# Patient Record
Sex: MALE | Race: BLACK OR AFRICAN AMERICAN | NOT HISPANIC OR LATINO | Employment: OTHER | URBAN - METROPOLITAN AREA
[De-identification: names, ages, dates, MRNs, and addresses within clinical notes are randomized per-mention and may not be internally consistent; named-entity substitution may affect disease eponyms.]

---

## 2024-04-04 ENCOUNTER — HOSPITAL ENCOUNTER (EMERGENCY)
Facility: HOSPITAL | Age: 64
Discharge: HOME | End: 2024-04-04
Attending: EMERGENCY MEDICINE
Payer: COMMERCIAL

## 2024-04-04 VITALS
TEMPERATURE: 98.7 F | SYSTOLIC BLOOD PRESSURE: 115 MMHG | HEIGHT: 64 IN | RESPIRATION RATE: 16 BRPM | WEIGHT: 121.25 LBS | OXYGEN SATURATION: 99 % | DIASTOLIC BLOOD PRESSURE: 75 MMHG | HEART RATE: 84 BPM | BODY MASS INDEX: 20.7 KG/M2

## 2024-04-04 DIAGNOSIS — N39.0 URINARY TRACT INFECTION ASSOCIATED WITH INDWELLING URETHRAL CATHETER, INITIAL ENCOUNTER (CMS-HCC): Primary | ICD-10-CM

## 2024-04-04 DIAGNOSIS — T83.511A URINARY TRACT INFECTION ASSOCIATED WITH INDWELLING URETHRAL CATHETER, INITIAL ENCOUNTER (CMS-HCC): Primary | ICD-10-CM

## 2024-04-04 LAB
ALBUMIN SERPL BCP-MCNC: 4.3 G/DL (ref 3.4–5)
ALP SERPL-CCNC: 77 U/L (ref 33–136)
ALT SERPL W P-5'-P-CCNC: 18 U/L (ref 10–52)
ANION GAP SERPL CALC-SCNC: 12 MMOL/L (ref 10–20)
APPEARANCE UR: ABNORMAL
AST SERPL W P-5'-P-CCNC: 18 U/L (ref 9–39)
BASOPHILS # BLD AUTO: 0.02 X10*3/UL (ref 0–0.1)
BASOPHILS NFR BLD AUTO: 0.2 %
BILIRUB SERPL-MCNC: 0.3 MG/DL (ref 0–1.2)
BILIRUB UR STRIP.AUTO-MCNC: NEGATIVE MG/DL
BNP SERPL-MCNC: 18 PG/ML (ref 0–99)
BUN SERPL-MCNC: 17 MG/DL (ref 6–23)
CALCIUM SERPL-MCNC: 9.9 MG/DL (ref 8.6–10.6)
CHLORIDE SERPL-SCNC: 107 MMOL/L (ref 98–107)
CO2 SERPL-SCNC: 27 MMOL/L (ref 21–32)
COLOR UR: ABNORMAL
CREAT SERPL-MCNC: 0.89 MG/DL (ref 0.5–1.3)
EGFRCR SERPLBLD CKD-EPI 2021: >90 ML/MIN/1.73M*2
EOSINOPHIL # BLD AUTO: 0.09 X10*3/UL (ref 0–0.7)
EOSINOPHIL NFR BLD AUTO: 1.1 %
ERYTHROCYTE [DISTWIDTH] IN BLOOD BY AUTOMATED COUNT: 14.3 % (ref 11.5–14.5)
GLUCOSE SERPL-MCNC: 121 MG/DL (ref 74–99)
GLUCOSE UR STRIP.AUTO-MCNC: NORMAL MG/DL
HCT VFR BLD AUTO: 41.6 % (ref 41–52)
HGB BLD-MCNC: 13.9 G/DL (ref 13.5–17.5)
IMM GRANULOCYTES # BLD AUTO: 0.01 X10*3/UL (ref 0–0.7)
IMM GRANULOCYTES NFR BLD AUTO: 0.1 % (ref 0–0.9)
KETONES UR STRIP.AUTO-MCNC: NEGATIVE MG/DL
LEUKOCYTE ESTERASE UR QL STRIP.AUTO: ABNORMAL
LYMPHOCYTES # BLD AUTO: 1.4 X10*3/UL (ref 1.2–4.8)
LYMPHOCYTES NFR BLD AUTO: 17.1 %
MCH RBC QN AUTO: 27.4 PG (ref 26–34)
MCHC RBC AUTO-ENTMCNC: 33.4 G/DL (ref 32–36)
MCV RBC AUTO: 82 FL (ref 80–100)
MONOCYTES # BLD AUTO: 0.51 X10*3/UL (ref 0.1–1)
MONOCYTES NFR BLD AUTO: 6.2 %
NEUTROPHILS # BLD AUTO: 6.17 X10*3/UL (ref 1.2–7.7)
NEUTROPHILS NFR BLD AUTO: 75.3 %
NITRITE UR QL STRIP.AUTO: ABNORMAL
NRBC BLD-RTO: 0 /100 WBCS (ref 0–0)
PH UR STRIP.AUTO: 8 [PH]
PLATELET # BLD AUTO: 122 X10*3/UL (ref 150–450)
POTASSIUM SERPL-SCNC: 4 MMOL/L (ref 3.5–5.3)
PROT SERPL-MCNC: 7.7 G/DL (ref 6.4–8.2)
PROT UR STRIP.AUTO-MCNC: ABNORMAL MG/DL
RBC # BLD AUTO: 5.08 X10*6/UL (ref 4.5–5.9)
RBC # UR STRIP.AUTO: ABNORMAL /UL
RBC #/AREA URNS AUTO: >20 /HPF
SODIUM SERPL-SCNC: 142 MMOL/L (ref 136–145)
SP GR UR STRIP.AUTO: 1.02
UROBILINOGEN UR STRIP.AUTO-MCNC: NORMAL MG/DL
WBC # BLD AUTO: 8.2 X10*3/UL (ref 4.4–11.3)
WBC #/AREA URNS AUTO: >50 /HPF

## 2024-04-04 PROCEDURE — 51702 INSERT TEMP BLADDER CATH: CPT

## 2024-04-04 PROCEDURE — 81001 URINALYSIS AUTO W/SCOPE: CPT | Performed by: EMERGENCY MEDICINE

## 2024-04-04 PROCEDURE — 83880 ASSAY OF NATRIURETIC PEPTIDE: CPT

## 2024-04-04 PROCEDURE — 85025 COMPLETE CBC W/AUTO DIFF WBC: CPT | Performed by: EMERGENCY MEDICINE

## 2024-04-04 PROCEDURE — 87086 URINE CULTURE/COLONY COUNT: CPT | Performed by: EMERGENCY MEDICINE

## 2024-04-04 PROCEDURE — 80053 COMPREHEN METABOLIC PANEL: CPT | Performed by: EMERGENCY MEDICINE

## 2024-04-04 PROCEDURE — 36415 COLL VENOUS BLD VENIPUNCTURE: CPT | Performed by: EMERGENCY MEDICINE

## 2024-04-04 PROCEDURE — 99283 EMERGENCY DEPT VISIT LOW MDM: CPT

## 2024-04-04 PROCEDURE — 99284 EMERGENCY DEPT VISIT MOD MDM: CPT | Performed by: EMERGENCY MEDICINE

## 2024-04-04 PROCEDURE — 2500000001 HC RX 250 WO HCPCS SELF ADMINISTERED DRUGS (ALT 637 FOR MEDICARE OP): Mod: SE

## 2024-04-04 RX ORDER — CEPHALEXIN 500 MG/1
500 CAPSULE ORAL 2 TIMES DAILY
Qty: 20 CAPSULE | Refills: 0 | Status: SHIPPED | OUTPATIENT
Start: 2024-04-04 | End: 2024-04-14

## 2024-04-04 RX ORDER — CEPHALEXIN 250 MG/1
500 CAPSULE ORAL ONCE
Status: COMPLETED | OUTPATIENT
Start: 2024-04-04 | End: 2024-04-04

## 2024-04-04 RX ORDER — TAMSULOSIN HYDROCHLORIDE 0.4 MG/1
0.4 CAPSULE ORAL DAILY
Qty: 30 CAPSULE | Refills: 0 | Status: SHIPPED | OUTPATIENT
Start: 2024-04-04 | End: 2024-05-04

## 2024-04-04 RX ADMIN — CEPHALEXIN 500 MG: 250 CAPSULE ORAL at 20:34

## 2024-04-04 ASSESSMENT — COLUMBIA-SUICIDE SEVERITY RATING SCALE - C-SSRS
6. HAVE YOU EVER DONE ANYTHING, STARTED TO DO ANYTHING, OR PREPARED TO DO ANYTHING TO END YOUR LIFE?: NO
2. HAVE YOU ACTUALLY HAD ANY THOUGHTS OF KILLING YOURSELF?: NO
1. IN THE PAST MONTH, HAVE YOU WISHED YOU WERE DEAD OR WISHED YOU COULD GO TO SLEEP AND NOT WAKE UP?: NO

## 2024-04-04 ASSESSMENT — PAIN - FUNCTIONAL ASSESSMENT: PAIN_FUNCTIONAL_ASSESSMENT: 0-10
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
## 2024-04-04 NOTE — ED PROVIDER NOTES
Emergency Medicine Transition of Care Note.    I received Gaetano Jarrett in signout from Dr. Solorio.  Please see the previous ED provider note for all HPI, PE and MDM up to the time of signout at 1800h. This is in addition to the primary record.    In brief Gaetano Jarrett is an 63 y.o. male with hx of BPH and chronic urinary retention requiring moreno since 11/23 ,moderate protein malnutrition with BMI 18, tobacco and cocaine use, presenting for acute urinary retention x2d and suprapubic pain/distension.   Chief Complaint   Patient presents with    Moreno Complication     At the time of signout we were awaiting: BNP, UA, and replacement of his moreno.     ED Course as of 04/04/24 2006   Thu Apr 04, 2024   1858 Per discussion with RD, new moreno was placed and pt made 1000cc of urine [JL]      ED Course User Index  [JL] Garrick Palomares MD         Diagnoses as of 04/04/24 2006   Urinary tract infection associated with indwelling urethral catheter, initial encounter (CMS/[facility])       Medical Decision Making  Moreno placement with spontaneous production of 1000cc urine. BNP WNL. Pt feeling much improved s/p moreno. UA resulted with +ve nitrites and leuk esterase, consistent with UTI. Will give 1 dose of keflex while in the ED, prior to discharge with a 10 day supply of keflex as well as a refill of his flomax (states he ran out 1d ago). Prior to DC, pt denies flank pain, and reports significant improvement in his distention and suprapubic pain, and is tolerating PO well without nausea/vomiting. Pt amenable with discharge plan.         Final diagnoses:   None         Procedure  Procedures    Garrick Palomares MD     Discharge        Garrick Palomares MD  Resident  04/04/24 2008     I have reviewed discharge instructions with the patient and spouse. The patient and spouse verbalized understanding. Patient left ED via Discharge Method: wheelchair to Home with spouse. Opportunity for questions and clarification provided. Patient given 1 scripts. To continue your aftercare when you leave the hospital, you may receive an automated call from our care team to check in on how you are doing. This is a free service and part of our promise to provide the best care and service to meet your aftercare needs.  If you have questions, or wish to unsubscribe from this service please call [phone]. Thank you for Choosing our [facility] Emergency Department.         Sowmya Werner RN  09/21/22 4749

## 2024-04-04 NOTE — ED TRIAGE NOTES
Pt to ED with suspected urinary catheter complications. Pt states he has had little to no urine output for 2 days and now has severe lower abdominal pain.

## 2024-04-05 LAB — HOLD SPECIMEN: NORMAL

## 2024-04-06 LAB — BACTERIA UR CULT: ABNORMAL

## 2024-04-07 ENCOUNTER — TELEPHONE (OUTPATIENT)
Dept: PHARMACY | Facility: HOSPITAL | Age: 64
End: 2024-04-07
Payer: COMMERCIAL

## 2024-04-07 NOTE — PROGRESS NOTES
EDPD Note: Duration Adjust    Contacted Gaetano Jarrett regarding positive urine contaminant culture/result that was taken during their recent emergency room visit. I completed education with  patient . Patient states he is feeling much better, so no repeat culture needed at this time. The patient is being treated with the Keflex; however, the duration of the discharge prescription is incorrect. I gave verbal education about the new medication duration found below. Advised patient to finish course of antibiotic and follow up with PCP or urgent care if symptoms do not completely resolve.  No further follow up needed from EDPD Team.     Discharged with Keflex 500mg BID for 10 days     Drug: Keflex 500mg  Si cap BID  Days Supply: 7    Angely Freeman, PharmD

## 2025-02-25 ENCOUNTER — HOSPITAL ENCOUNTER (INPATIENT)
Facility: HOSPITAL | Age: 65
LOS: 2 days | Discharge: NURSING, REHAB OR LONG-TERM CARE | End: 2025-02-28
Attending: EMERGENCY MEDICINE | Admitting: STUDENT IN AN ORGANIZED HEALTH CARE EDUCATION/TRAINING PROGRAM
Payer: COMMERCIAL

## 2025-02-25 ENCOUNTER — APPOINTMENT (OUTPATIENT)
Dept: RADIOLOGY | Facility: HOSPITAL | Age: 65
End: 2025-02-25
Payer: COMMERCIAL

## 2025-02-25 DIAGNOSIS — E43 SEVERE PROTEIN-CALORIE MALNUTRITION (MULTI): ICD-10-CM

## 2025-02-25 DIAGNOSIS — T83.091A OBSTRUCTION OF FOLEY CATHETER, INITIAL ENCOUNTER (CMS-HCC): ICD-10-CM

## 2025-02-25 DIAGNOSIS — N13.9 URINARY OBSTRUCTION: Primary | ICD-10-CM

## 2025-02-25 DIAGNOSIS — F19.10 SUBSTANCE ABUSE (MULTI): ICD-10-CM

## 2025-02-25 DIAGNOSIS — N31.9 NEUROGENIC BLADDER: ICD-10-CM

## 2025-02-25 DIAGNOSIS — I82.4Y3 DVT, LOWER EXTREMITY, PROXIMAL, ACUTE, BILATERAL (MULTI): ICD-10-CM

## 2025-02-25 DIAGNOSIS — N17.9 AKI (ACUTE KIDNEY INJURY) (CMS-HCC): ICD-10-CM

## 2025-02-25 DIAGNOSIS — F14.10 COCAINE USE DISORDER (MULTI): ICD-10-CM

## 2025-02-25 DIAGNOSIS — R60.0 LOCALIZED EDEMA: ICD-10-CM

## 2025-02-25 DIAGNOSIS — R29.898 MUSCULAR DECONDITIONING: ICD-10-CM

## 2025-02-25 DIAGNOSIS — F17.200 NICOTINE USE DISORDER: ICD-10-CM

## 2025-02-25 PROBLEM — N40.0 BENIGN PROSTATIC HYPERPLASIA: Status: ACTIVE | Noted: 2024-03-02

## 2025-02-25 LAB
ALBUMIN SERPL BCP-MCNC: 4.1 G/DL (ref 3.4–5)
ALP SERPL-CCNC: 75 U/L (ref 33–136)
ALT SERPL W P-5'-P-CCNC: 13 U/L (ref 10–52)
ANION GAP BLDV CALCULATED.4IONS-SCNC: 16 MMOL/L (ref 10–25)
ANION GAP SERPL CALC-SCNC: 19 MMOL/L (ref 10–20)
ANION GAP SERPL CALC-SCNC: 22 MMOL/L (ref 10–20)
APPEARANCE UR: ABNORMAL
AST SERPL W P-5'-P-CCNC: 12 U/L (ref 9–39)
BASE EXCESS BLDV CALC-SCNC: -6.6 MMOL/L (ref -2–3)
BASOPHILS # BLD AUTO: 0.02 X10*3/UL (ref 0–0.1)
BASOPHILS NFR BLD AUTO: 0.2 %
BILIRUB SERPL-MCNC: 0.7 MG/DL (ref 0–1.2)
BILIRUB UR STRIP.AUTO-MCNC: NEGATIVE MG/DL
BODY TEMPERATURE: 37 DEGREES CELSIUS
BUN SERPL-MCNC: 123 MG/DL (ref 6–23)
BUN SERPL-MCNC: 133 MG/DL (ref 6–23)
CA-I BLDV-SCNC: 1.18 MMOL/L (ref 1.1–1.33)
CALCIUM SERPL-MCNC: 9.1 MG/DL (ref 8.6–10.6)
CALCIUM SERPL-MCNC: 9.4 MG/DL (ref 8.6–10.6)
CHLORIDE BLDV-SCNC: 101 MMOL/L (ref 98–107)
CHLORIDE SERPL-SCNC: 103 MMOL/L (ref 98–107)
CHLORIDE SERPL-SCNC: 99 MMOL/L (ref 98–107)
CO2 SERPL-SCNC: 16 MMOL/L (ref 21–32)
CO2 SERPL-SCNC: 18 MMOL/L (ref 21–32)
COLOR UR: ABNORMAL
CREAT SERPL-MCNC: 5.82 MG/DL (ref 0.5–1.3)
CREAT SERPL-MCNC: 7.15 MG/DL (ref 0.5–1.3)
EGFRCR SERPLBLD CKD-EPI 2021: 10 ML/MIN/1.73M*2
EGFRCR SERPLBLD CKD-EPI 2021: 8 ML/MIN/1.73M*2
EOSINOPHIL # BLD AUTO: 0.01 X10*3/UL (ref 0–0.7)
EOSINOPHIL NFR BLD AUTO: 0.1 %
ERYTHROCYTE [DISTWIDTH] IN BLOOD BY AUTOMATED COUNT: 13.2 % (ref 11.5–14.5)
GLUCOSE BLDV-MCNC: 187 MG/DL (ref 74–99)
GLUCOSE SERPL-MCNC: 113 MG/DL (ref 74–99)
GLUCOSE SERPL-MCNC: 90 MG/DL (ref 74–99)
GLUCOSE UR STRIP.AUTO-MCNC: NORMAL MG/DL
HCO3 BLDV-SCNC: 20.2 MMOL/L (ref 22–26)
HCT VFR BLD AUTO: 40.2 % (ref 41–52)
HCT VFR BLD EST: 42 % (ref 41–52)
HGB BLD-MCNC: 14.2 G/DL (ref 13.5–17.5)
HGB BLDV-MCNC: 14.1 G/DL (ref 13.5–17.5)
HOLD SPECIMEN: NORMAL
IMM GRANULOCYTES # BLD AUTO: 0.13 X10*3/UL (ref 0–0.7)
IMM GRANULOCYTES NFR BLD AUTO: 1.4 % (ref 0–0.9)
INHALED O2 CONCENTRATION: 21 %
KETONES UR STRIP.AUTO-MCNC: NEGATIVE MG/DL
LACTATE BLDV-SCNC: 1.6 MMOL/L (ref 0.4–2)
LEUKOCYTE ESTERASE UR QL STRIP.AUTO: ABNORMAL
LYMPHOCYTES # BLD AUTO: 1.06 X10*3/UL (ref 1.2–4.8)
LYMPHOCYTES NFR BLD AUTO: 11.2 %
MCH RBC QN AUTO: 28.1 PG (ref 26–34)
MCHC RBC AUTO-ENTMCNC: 35.3 G/DL (ref 32–36)
MCV RBC AUTO: 80 FL (ref 80–100)
MONOCYTES # BLD AUTO: 0.64 X10*3/UL (ref 0.1–1)
MONOCYTES NFR BLD AUTO: 6.8 %
NEUTROPHILS # BLD AUTO: 7.62 X10*3/UL (ref 1.2–7.7)
NEUTROPHILS NFR BLD AUTO: 80.3 %
NITRITE UR QL STRIP.AUTO: NEGATIVE
NRBC BLD-RTO: 0 /100 WBCS (ref 0–0)
OXYHGB MFR BLDV: 20.6 % (ref 45–75)
PCO2 BLDV: 44 MM HG (ref 41–51)
PH BLDV: 7.27 PH (ref 7.33–7.43)
PH UR STRIP.AUTO: 5.5 [PH]
PLATELET # BLD AUTO: 97 X10*3/UL (ref 150–450)
PO2 BLDV: 22 MM HG (ref 35–45)
POTASSIUM BLDV-SCNC: 4.6 MMOL/L (ref 3.5–5.3)
POTASSIUM SERPL-SCNC: 4.3 MMOL/L (ref 3.5–5.3)
POTASSIUM SERPL-SCNC: 5.3 MMOL/L (ref 3.5–5.3)
PROT SERPL-MCNC: 7.8 G/DL (ref 6.4–8.2)
PROT UR STRIP.AUTO-MCNC: ABNORMAL MG/DL
RBC # BLD AUTO: 5.05 X10*6/UL (ref 4.5–5.9)
RBC # UR STRIP.AUTO: ABNORMAL MG/DL
RBC #/AREA URNS AUTO: ABNORMAL /HPF
SAO2 % BLDV: 21 % (ref 45–75)
SODIUM BLDV-SCNC: 133 MMOL/L (ref 136–145)
SODIUM SERPL-SCNC: 132 MMOL/L (ref 136–145)
SODIUM SERPL-SCNC: 136 MMOL/L (ref 136–145)
SP GR UR STRIP.AUTO: 1.01
UROBILINOGEN UR STRIP.AUTO-MCNC: NORMAL MG/DL
WBC # BLD AUTO: 9.5 X10*3/UL (ref 4.4–11.3)
WBC #/AREA URNS AUTO: ABNORMAL /HPF

## 2025-02-25 PROCEDURE — 51702 INSERT TEMP BLADDER CATH: CPT

## 2025-02-25 PROCEDURE — 96372 THER/PROPH/DIAG INJ SC/IM: CPT | Performed by: NURSE PRACTITIONER

## 2025-02-25 PROCEDURE — 36415 COLL VENOUS BLD VENIPUNCTURE: CPT

## 2025-02-25 PROCEDURE — 76770 US EXAM ABDO BACK WALL COMP: CPT

## 2025-02-25 PROCEDURE — 51798 US URINE CAPACITY MEASURE: CPT

## 2025-02-25 PROCEDURE — 84132 ASSAY OF SERUM POTASSIUM: CPT

## 2025-02-25 PROCEDURE — 99223 1ST HOSP IP/OBS HIGH 75: CPT | Performed by: NURSE PRACTITIONER

## 2025-02-25 PROCEDURE — 97161 PT EVAL LOW COMPLEX 20 MIN: CPT | Mod: GP

## 2025-02-25 PROCEDURE — 2500000004 HC RX 250 GENERAL PHARMACY W/ HCPCS (ALT 636 FOR OP/ED): Mod: SE

## 2025-02-25 PROCEDURE — 96375 TX/PRO/DX INJ NEW DRUG ADDON: CPT | Mod: 59

## 2025-02-25 PROCEDURE — 2500000004 HC RX 250 GENERAL PHARMACY W/ HCPCS (ALT 636 FOR OP/ED): Mod: SE | Performed by: NURSE PRACTITIONER

## 2025-02-25 PROCEDURE — 85025 COMPLETE CBC W/AUTO DIFF WBC: CPT

## 2025-02-25 PROCEDURE — 2500000002 HC RX 250 W HCPCS SELF ADMINISTERED DRUGS (ALT 637 FOR MEDICARE OP, ALT 636 FOR OP/ED): Mod: SE | Performed by: NURSE PRACTITIONER

## 2025-02-25 PROCEDURE — 96361 HYDRATE IV INFUSION ADD-ON: CPT | Mod: 59

## 2025-02-25 PROCEDURE — G0378 HOSPITAL OBSERVATION PER HR: HCPCS

## 2025-02-25 PROCEDURE — 96365 THER/PROPH/DIAG IV INF INIT: CPT | Mod: 59

## 2025-02-25 PROCEDURE — 76770 US EXAM ABDO BACK WALL COMP: CPT | Performed by: STUDENT IN AN ORGANIZED HEALTH CARE EDUCATION/TRAINING PROGRAM

## 2025-02-25 PROCEDURE — 97530 THERAPEUTIC ACTIVITIES: CPT | Mod: GP

## 2025-02-25 PROCEDURE — 96366 THER/PROPH/DIAG IV INF ADDON: CPT | Mod: 59

## 2025-02-25 PROCEDURE — 81001 URINALYSIS AUTO W/SCOPE: CPT

## 2025-02-25 PROCEDURE — 87086 URINE CULTURE/COLONY COUNT: CPT

## 2025-02-25 PROCEDURE — 80053 COMPREHEN METABOLIC PANEL: CPT

## 2025-02-25 PROCEDURE — 99285 EMERGENCY DEPT VISIT HI MDM: CPT | Mod: 25 | Performed by: EMERGENCY MEDICINE

## 2025-02-25 RX ORDER — AMOXICILLIN 250 MG
2 CAPSULE ORAL 2 TIMES DAILY
Status: DISCONTINUED | OUTPATIENT
Start: 2025-02-25 | End: 2025-02-28 | Stop reason: HOSPADM

## 2025-02-25 RX ORDER — CEFTRIAXONE 1 G/50ML
1 INJECTION, SOLUTION INTRAVENOUS DAILY
Status: DISCONTINUED | OUTPATIENT
Start: 2025-02-26 | End: 2025-02-28 | Stop reason: HOSPADM

## 2025-02-25 RX ORDER — HEPARIN SODIUM 5000 [USP'U]/ML
5000 INJECTION, SOLUTION INTRAVENOUS; SUBCUTANEOUS EVERY 8 HOURS
Status: DISCONTINUED | OUTPATIENT
Start: 2025-02-25 | End: 2025-02-28

## 2025-02-25 RX ORDER — IBUPROFEN 200 MG
1 TABLET ORAL
COMMUNITY
Start: 2024-03-03

## 2025-02-25 RX ORDER — TAMSULOSIN HYDROCHLORIDE 0.4 MG/1
0.4 CAPSULE ORAL DAILY
COMMUNITY
Start: 2024-08-26

## 2025-02-25 RX ORDER — SODIUM CHLORIDE, SODIUM LACTATE, POTASSIUM CHLORIDE, CALCIUM CHLORIDE 600; 310; 30; 20 MG/100ML; MG/100ML; MG/100ML; MG/100ML
250 INJECTION, SOLUTION INTRAVENOUS CONTINUOUS
Status: DISPENSED | OUTPATIENT
Start: 2025-02-25 | End: 2025-02-26

## 2025-02-25 RX ORDER — MORPHINE SULFATE 4 MG/ML
4 INJECTION INTRAVENOUS ONCE
Status: COMPLETED | OUTPATIENT
Start: 2025-02-25 | End: 2025-02-25

## 2025-02-25 RX ORDER — IBUPROFEN 200 MG
1 TABLET ORAL EVERY 24 HOURS
Status: DISCONTINUED | OUTPATIENT
Start: 2025-02-25 | End: 2025-02-28 | Stop reason: HOSPADM

## 2025-02-25 RX ORDER — CEFTRIAXONE 1 G/50ML
INJECTION, SOLUTION INTRAVENOUS
Status: COMPLETED
Start: 2025-02-25 | End: 2025-02-25

## 2025-02-25 RX ORDER — CEFTRIAXONE 1 G/50ML
1 INJECTION, SOLUTION INTRAVENOUS ONCE
Status: COMPLETED | OUTPATIENT
Start: 2025-02-25 | End: 2025-02-25

## 2025-02-25 RX ORDER — LIDOCAINE 4 G/100G
1 PATCH TOPICAL DAILY
COMMUNITY
Start: 2024-10-11 | End: 2025-02-25 | Stop reason: ALTCHOICE

## 2025-02-25 RX ORDER — TAMSULOSIN HYDROCHLORIDE 0.4 MG/1
0.4 CAPSULE ORAL DAILY
Status: DISCONTINUED | OUTPATIENT
Start: 2025-02-25 | End: 2025-02-28 | Stop reason: HOSPADM

## 2025-02-25 RX ADMIN — CEFTRIAXONE SODIUM 1 G: 1 INJECTION, SOLUTION INTRAVENOUS at 12:50

## 2025-02-25 RX ADMIN — CEFTRIAXONE 1 G: 1 INJECTION, SOLUTION INTRAVENOUS at 12:50

## 2025-02-25 RX ADMIN — MORPHINE SULFATE 4 MG: 4 INJECTION INTRAVENOUS at 13:12

## 2025-02-25 RX ADMIN — HEPARIN SODIUM 5000 UNITS: 5000 INJECTION, SOLUTION INTRAVENOUS; SUBCUTANEOUS at 22:23

## 2025-02-25 RX ADMIN — SODIUM CHLORIDE, POTASSIUM CHLORIDE, SODIUM LACTATE AND CALCIUM CHLORIDE 250 ML/HR: 600; 310; 30; 20 INJECTION, SOLUTION INTRAVENOUS at 22:43

## 2025-02-25 RX ADMIN — HEPARIN SODIUM 5000 UNITS: 5000 INJECTION, SOLUTION INTRAVENOUS; SUBCUTANEOUS at 14:44

## 2025-02-25 RX ADMIN — SODIUM CHLORIDE, POTASSIUM CHLORIDE, SODIUM LACTATE AND CALCIUM CHLORIDE 250 ML/HR: 600; 310; 30; 20 INJECTION, SOLUTION INTRAVENOUS at 16:45

## 2025-02-25 RX ADMIN — SODIUM CHLORIDE 1000 ML: 9 INJECTION, SOLUTION INTRAVENOUS at 12:50

## 2025-02-25 RX ADMIN — TAMSULOSIN HYDROCHLORIDE 0.4 MG: 0.4 CAPSULE ORAL at 14:44

## 2025-02-25 SDOH — ECONOMIC STABILITY: HOUSING INSECURITY: AT ANY TIME IN THE PAST 12 MONTHS, WERE YOU HOMELESS OR LIVING IN A SHELTER (INCLUDING NOW)?: YES

## 2025-02-25 SDOH — SOCIAL STABILITY: SOCIAL INSECURITY
WITHIN THE LAST YEAR, HAVE YOU BEEN RAPED OR FORCED TO HAVE ANY KIND OF SEXUAL ACTIVITY BY YOUR PARTNER OR EX-PARTNER?: NO

## 2025-02-25 SDOH — ECONOMIC STABILITY: FOOD INSECURITY: HOW HARD IS IT FOR YOU TO PAY FOR THE VERY BASICS LIKE FOOD, HOUSING, MEDICAL CARE, AND HEATING?: VERY HARD

## 2025-02-25 SDOH — ECONOMIC STABILITY: FOOD INSECURITY: WITHIN THE PAST 12 MONTHS, YOU WORRIED THAT YOUR FOOD WOULD RUN OUT BEFORE YOU GOT THE MONEY TO BUY MORE.: OFTEN TRUE

## 2025-02-25 SDOH — ECONOMIC STABILITY: INCOME INSECURITY: IN THE PAST 12 MONTHS HAS THE ELECTRIC, GAS, OIL, OR WATER COMPANY THREATENED TO SHUT OFF SERVICES IN YOUR HOME?: YES

## 2025-02-25 SDOH — SOCIAL STABILITY: SOCIAL INSECURITY: HAVE YOU HAD ANY THOUGHTS OF HARMING ANYONE ELSE?: NO

## 2025-02-25 SDOH — ECONOMIC STABILITY: FOOD INSECURITY: WITHIN THE PAST 12 MONTHS, THE FOOD YOU BOUGHT JUST DIDN'T LAST AND YOU DIDN'T HAVE MONEY TO GET MORE.: OFTEN TRUE

## 2025-02-25 SDOH — SOCIAL STABILITY: SOCIAL INSECURITY: DOES ANYONE TRY TO KEEP YOU FROM HAVING/CONTACTING OTHER FRIENDS OR DOING THINGS OUTSIDE YOUR HOME?: NO

## 2025-02-25 SDOH — SOCIAL STABILITY: SOCIAL INSECURITY
WITHIN THE LAST YEAR, HAVE YOU BEEN KICKED, HIT, SLAPPED, OR OTHERWISE PHYSICALLY HURT BY YOUR PARTNER OR EX-PARTNER?: NO

## 2025-02-25 SDOH — SOCIAL STABILITY: SOCIAL INSECURITY: HAS ANYONE EVER THREATENED TO HURT YOUR FAMILY OR YOUR PETS?: NO

## 2025-02-25 SDOH — SOCIAL STABILITY: SOCIAL INSECURITY: HAVE YOU HAD THOUGHTS OF HARMING ANYONE ELSE?: NO

## 2025-02-25 SDOH — SOCIAL STABILITY: SOCIAL INSECURITY: WITHIN THE LAST YEAR, HAVE YOU BEEN AFRAID OF YOUR PARTNER OR EX-PARTNER?: NO

## 2025-02-25 SDOH — ECONOMIC STABILITY: HOUSING INSECURITY: IN THE LAST 12 MONTHS, WAS THERE A TIME WHEN YOU WERE NOT ABLE TO PAY THE MORTGAGE OR RENT ON TIME?: YES

## 2025-02-25 SDOH — ECONOMIC STABILITY: TRANSPORTATION INSECURITY: IN THE PAST 12 MONTHS, HAS LACK OF TRANSPORTATION KEPT YOU FROM MEDICAL APPOINTMENTS OR FROM GETTING MEDICATIONS?: YES

## 2025-02-25 SDOH — SOCIAL STABILITY: SOCIAL INSECURITY: DO YOU FEEL ANYONE HAS EXPLOITED OR TAKEN ADVANTAGE OF YOU FINANCIALLY OR OF YOUR PERSONAL PROPERTY?: NO

## 2025-02-25 SDOH — SOCIAL STABILITY: SOCIAL INSECURITY: WERE YOU ABLE TO COMPLETE ALL THE BEHAVIORAL HEALTH SCREENINGS?: YES

## 2025-02-25 SDOH — SOCIAL STABILITY: SOCIAL INSECURITY: ARE YOU OR HAVE YOU BEEN THREATENED OR ABUSED PHYSICALLY, EMOTIONALLY, OR SEXUALLY BY ANYONE?: NO

## 2025-02-25 SDOH — SOCIAL STABILITY: SOCIAL INSECURITY: ABUSE: ADULT

## 2025-02-25 SDOH — ECONOMIC STABILITY: GENERAL: SOCIAL DRIVERS OF HEALTH CONSIDERATIONS: HOUSING INSECURITY

## 2025-02-25 SDOH — SOCIAL STABILITY: SOCIAL INSECURITY: WITHIN THE LAST YEAR, HAVE YOU BEEN HUMILIATED OR EMOTIONALLY ABUSED IN OTHER WAYS BY YOUR PARTNER OR EX-PARTNER?: NO

## 2025-02-25 SDOH — SOCIAL STABILITY: SOCIAL INSECURITY: ARE THERE ANY APPARENT SIGNS OF INJURIES/BEHAVIORS THAT COULD BE RELATED TO ABUSE/NEGLECT?: NO

## 2025-02-25 SDOH — ECONOMIC STABILITY: HOUSING INSECURITY: IN THE PAST 12 MONTHS, HOW MANY TIMES HAVE YOU MOVED WHERE YOU WERE LIVING?: 0

## 2025-02-25 SDOH — SOCIAL STABILITY: SOCIAL INSECURITY: DO YOU FEEL UNSAFE GOING BACK TO THE PLACE WHERE YOU ARE LIVING?: NO

## 2025-02-25 ASSESSMENT — ACTIVITIES OF DAILY LIVING (ADL)
LACK_OF_TRANSPORTATION: YES
WALKS IN HOME: DEPENDENT
TOILETING: NEEDS ASSISTANCE
HEARING - RIGHT EAR: FUNCTIONAL
FEEDING YOURSELF: INDEPENDENT
HEARING - LEFT EAR: FUNCTIONAL
JUDGMENT_ADEQUATE_SAFELY_COMPLETE_DAILY_ACTIVITIES: YES
BATHING: NEEDS ASSISTANCE
LACK_OF_TRANSPORTATION: YES
GROOMING: NEEDS ASSISTANCE
PATIENT'S MEMORY ADEQUATE TO SAFELY COMPLETE DAILY ACTIVITIES?: YES
ADEQUATE_TO_COMPLETE_ADL: YES
LACK_OF_TRANSPORTATION: YES
DRESSING YOURSELF: NEEDS ASSISTANCE

## 2025-02-25 ASSESSMENT — COGNITIVE AND FUNCTIONAL STATUS - GENERAL
TURNING FROM BACK TO SIDE WHILE IN FLAT BAD: A LITTLE
TURNING FROM BACK TO SIDE WHILE IN FLAT BAD: A LITTLE
PATIENT BASELINE BEDBOUND: NO
DRESSING REGULAR UPPER BODY CLOTHING: A LITTLE
MOBILITY SCORE: 14
MOVING FROM LYING ON BACK TO SITTING ON SIDE OF FLAT BED WITH BEDRAILS: A LITTLE
TOILETING: A LOT
DRESSING REGULAR LOWER BODY CLOTHING: A LITTLE
MOVING TO AND FROM BED TO CHAIR: A LOT
STANDING UP FROM CHAIR USING ARMS: A LOT
WALKING IN HOSPITAL ROOM: A LOT
CLIMB 3 TO 5 STEPS WITH RAILING: TOTAL
MOVING TO AND FROM BED TO CHAIR: A LOT
CLIMB 3 TO 5 STEPS WITH RAILING: TOTAL
WALKING IN HOSPITAL ROOM: A LOT
MOBILITY SCORE: 13
HELP NEEDED FOR BATHING: A LITTLE
DAILY ACTIVITIY SCORE: 18
STANDING UP FROM CHAIR USING ARMS: A LITTLE
PERSONAL GROOMING: A LITTLE
MOVING FROM LYING ON BACK TO SITTING ON SIDE OF FLAT BED WITH BEDRAILS: A LITTLE

## 2025-02-25 ASSESSMENT — PATIENT HEALTH QUESTIONNAIRE - PHQ9
1. LITTLE INTEREST OR PLEASURE IN DOING THINGS: NOT AT ALL
SUM OF ALL RESPONSES TO PHQ9 QUESTIONS 1 & 2: 0
2. FEELING DOWN, DEPRESSED OR HOPELESS: NOT AT ALL

## 2025-02-25 ASSESSMENT — LIFESTYLE VARIABLES
EVER FELT BAD OR GUILTY ABOUT YOUR DRINKING: NO
TOTAL SCORE: 0
AUDIT-C TOTAL SCORE: -1
HOW OFTEN DO YOU HAVE A DRINK CONTAINING ALCOHOL: PATIENT DECLINED
HAVE PEOPLE ANNOYED YOU BY CRITICIZING YOUR DRINKING: NO
HOW OFTEN DO YOU HAVE 6 OR MORE DRINKS ON ONE OCCASION: PATIENT DECLINED
EVER HAD A DRINK FIRST THING IN THE MORNING TO STEADY YOUR NERVES TO GET RID OF A HANGOVER: NO
SKIP TO QUESTIONS 9-10: 0
AUDIT-C TOTAL SCORE: -1
HAVE YOU EVER FELT YOU SHOULD CUT DOWN ON YOUR DRINKING: NO
HOW MANY STANDARD DRINKS CONTAINING ALCOHOL DO YOU HAVE ON A TYPICAL DAY: PATIENT DECLINED

## 2025-02-25 ASSESSMENT — ENCOUNTER SYMPTOMS
GASTROINTESTINAL NEGATIVE: 1
CONSTITUTIONAL NEGATIVE: 1
EYES NEGATIVE: 1
NEUROLOGICAL NEGATIVE: 1
MUSCULOSKELETAL NEGATIVE: 1
CARDIOVASCULAR NEGATIVE: 1

## 2025-02-25 ASSESSMENT — PAIN DESCRIPTION - LOCATION
LOCATION: ABDOMEN
LOCATION: ABDOMEN

## 2025-02-25 ASSESSMENT — PAIN - FUNCTIONAL ASSESSMENT
PAIN_FUNCTIONAL_ASSESSMENT: 0-10

## 2025-02-25 ASSESSMENT — PAIN SCALES - GENERAL
PAINLEVEL_OUTOF10: 0 - NO PAIN
PAINLEVEL_OUTOF10: 2
PAINLEVEL_OUTOF10: 0 - NO PAIN
PAINLEVEL_OUTOF10: 0 - NO PAIN
PAINLEVEL_OUTOF10: 7
PAINLEVEL_OUTOF10: 6
PAINLEVEL_OUTOF10: 10 - WORST POSSIBLE PAIN

## 2025-02-25 ASSESSMENT — COLUMBIA-SUICIDE SEVERITY RATING SCALE - C-SSRS
2. HAVE YOU ACTUALLY HAD ANY THOUGHTS OF KILLING YOURSELF?: NO
6. HAVE YOU EVER DONE ANYTHING, STARTED TO DO ANYTHING, OR PREPARED TO DO ANYTHING TO END YOUR LIFE?: NO
1. IN THE PAST MONTH, HAVE YOU WISHED YOU WERE DEAD OR WISHED YOU COULD GO TO SLEEP AND NOT WAKE UP?: NO

## 2025-02-25 ASSESSMENT — PAIN DESCRIPTION - PAIN TYPE: TYPE: ACUTE PAIN

## 2025-02-25 NOTE — ED PROVIDER NOTES
EMERGENCY DEPARTMENT ENCOUNTER      Pt Name: Gaetano Jarrett  MRN: [id number]  Birthdate 1960  Date of evaluation: 2/25/2025    HISTORY OF PRESENT ILLNESS    Gaetano Jarrett is an 64 y.o. male with history including BPH, osteoarthritis, neurogenic bladder with chronic indwelling Dan catheter, DVT on Eliquis, and substance abuse presenting to the emergency department for Dan complication.  Patient states that his Dan stopped draining a week ago.  Over the last few days he has had urine leaking around the Dan but nothing coming out of the Dan.  He is got a lot of abdominal distention and discomfort.  Patient states the last time the Dan was exchanged was a probably a month and a half ago.  Patient is currently living with his girlfriend and his partner's children.  He was not aware that he had to have the Dan exchanged every month.  He has had this Dan for 2 years.  Patient has also been mostly bedbound for 2 years.  He denies any chest pain, shortness of breath, fevers, chills, nausea, vomiting, diarrhea, constipation.      PAST MEDICAL HISTORY   No past medical history on file.    SURGICAL HISTORY     No past surgical history on file.    CURRENT MEDICATIONS       Previous Medications    No medications on file       ALLERGIES     Patient has no known allergies.    FAMILY HISTORY     No family history on file.     SOCIAL HISTORY       Social History     Socioeconomic History    Marital status: Single     Social Drivers of Health     Financial Resource Strain: High Risk (3/1/2024)    Received from [facility]    Overall Financial Resource Strain (CARDIA)     Difficulty of Paying Living Expenses: Hard   Food Insecurity: Food Insecurity Present (3/1/2024)    Received from [facility]    Hunger Vital Sign     Worried About Running Out of Food in the Last Year: Sometimes true     Ran Out of Food in the Last Year: Sometimes true   Transportation Needs: Unmet Transportation Needs (3/1/2024)     Received from [facility]    PRAPARE - Transportation     Lack of Transportation (Medical): Yes     Lack of Transportation (Non-Medical): No   Housing Stability: High Risk (3/1/2024)    Received from [facility]    Housing Stability Vital Sign     Unable to Pay for Housing in the Last Year: Yes     Number of Places Lived in the Last Year: 2     In the last 12 months, was there a time when you did not have a steady place to sleep or slept in a shelter (including now)?: Yes       PHYSICAL EXAM       ED Triage Vitals [02/25/25 1024]   Temperature Heart Rate Respirations BP   37 °C (98.6 °F) 90 16 132/89      Pulse Ox Temp Source Heart Rate Source Patient Position   98 % Oral Monitor Lying      BP Location FiO2 (%)     Right arm --       Physical Exam  Vitals and nursing note reviewed.   Constitutional:       General: He is not in acute distress.     Appearance: He is well-developed.      Comments: Underweight   HENT:      Head: Normocephalic.      Comments: Temporal wasting  Eyes:      Conjunctiva/sclera: Conjunctivae normal.   Cardiovascular:      Rate and Rhythm: Normal rate and regular rhythm.      Heart sounds: No murmur heard.  Pulmonary:      Effort: Pulmonary effort is normal. No respiratory distress.      Breath sounds: Normal breath sounds.   Abdominal:      General: There is distension.      Palpations: Abdomen is soft. There is mass.      Tenderness: There is no abdominal tenderness.   Musculoskeletal:         General: No swelling.   Skin:     General: Skin is warm and dry.   Neurological:      General: No focal deficit present.      Mental Status: He is alert and oriented to person, place, and time.          DIAGNOSTIC RESULTS     LABS:  Labs Reviewed - No data to display    All other labs were within normal range or not returned as of this dictation.    Imaging  No orders to display        Procedures  Procedures     EMERGENCY DEPARTMENT COURSE/MDM:   Medical Decision Making  Gaetano Jarrett is an  64 y.o. male with history including BPH, osteoarthritis, neurogenic bladder with chronic indwelling Dan catheter, DVT on Eliquis, and substance abuse presenting to the emergency department for Dan complication.  On arrival there was obvious leaking of urine around the Dan catheter.  There was no urine in the Dan catheter bag or tubing.  Patient had a severely distended abdomen as well as a palpable bladder in the suprapubic region.  Bedside bladder scan showed greater than 17,000 mL.  Dan was exchanged for a new catheter and patient had 2 L of fluid.  Patient had improvement after exchanging of the Dan catheter.    The urine in the Dan was severely cloudy with tinge of blood.  It was also malodorous concerning for infection.  Despite the urinalysis not showing any bacteria highly suspicious for infection started on Rocephin.  Basic lab workup ordered.  Patient has an MANUEL secondary to postrenal obstruction.  Patient will need to be monitored and make sure that he has good renal improvement.  Started on the liter of fluids.    There is a concern about his current social situation as patient appears to be severely underweight and did not have appropriate care or medical care for a Dan complication.  Social work involved for discharge planning.        Diagnoses as of 02/27/25 1647   MANUEL (acute kidney injury) (CMS-HCC)   Obstruction of Dan catheter, initial encounter (CMS-HCC)        External records reviewed: recent inpatient, clinic, and prior ED notes  Labs and Diagnostic imaging independently reviewed/interpreted by me.    Patient plan, care, lab results and imaging were all discussed with attending.    ED Medications administered this visit:  Medications - No data to display  New Prescriptions from this visit:    New Prescriptions    No medications on file       (Please note that portions of this note were completed with a voice recognition program.  Efforts were made to edit the dictations but  occasionally words are mis-transcribed.)     Debora Banks, DO  Resident  02/27/25 9217

## 2025-02-25 NOTE — PROGRESS NOTES
Physical Therapy    Physical Therapy Evaluation & Treatment    Patient Name: Gaetano Jarrett  MRN: [id number]  [place] [facility]  Room: [place] [address]/[id number]  Today's Date: 2/25/2025   Time Calculation  Start Time: 1639  Stop Time: 1707  Time Calculation (min): 28 min    Assessment/Plan   PT Assessment  PT Assessment Results: Decreased strength, Impaired balance, Decreased mobility, Decreased safety awareness, Pain  Rehab Prognosis: Good  Barriers to Discharge Home: Caregiver assistance, Physical needs  Caregiver Assistance: Caregiver assistance needed per identified barriers - however, level of patient's required assistance exceeds assistance available at home  Physical Needs: Ambulating household distances limited by function/safety, High falls risk due to function or environment  Social Drivers of Health Considerations: Housing insecurity  End of Session Communication: Bedside nurse  Assessment Comment: Pt currently limited with mobility, needing assist to complete transfers and short distance amb.  Will benefit from skilled PT to progress towards safe (I) functional mobility.  End of Session Patient Position: Alarm off, not on at start of session (transport cart 2 rails up)   IP OR SWING BED PT PLAN  Inpatient or Swing Bed: Inpatient  PT Plan  Treatment/Interventions: Bed mobility, Transfer training, Gait training, Stair training, Therapeutic exercise, Therapeutic activity  PT Plan: Ongoing PT  PT Frequency: 3 times per week  PT Discharge Recommendations: Moderate intensity level of continued care  Equipment Recommended upon Discharge: Wheeled walker  PT Recommended Transfer Status: Assist x1, Assistive device  PT - OK to Discharge: Yes (POC/goals/discharge rec intensity created)      Subjective     General Visit Information:  General  Reason for Referral: urinary retention/MANUEL/UTI  Past Medical History Relevant to Rehab: BPH, osteoarthritis, neurogenic bladder with chronic indwelling Dan catheter, DVT  Prior to  "Session Communication: Bedside nurse  General Comment: Pt just back from ultrasound, agreeable to participate though states he is sore from US.  Slow to move, assisted into standing and taking a few steps off transport cart in ED room.  Limited by pain in (L) LE as well as connected to IV attached to transport cart. Will continue to follow.  Home Living:  Home Living  Type of Home:  (? homeless. Per chart review, was discharged to SNF [facility]24 ([facility]), sober living house in 8/2024, homeless shelter.  Pt gives vague description of house with \"some\" stairs, lives with s.o.)  Prior Level of Function:  Prior Function Per Pt/Caregiver Report  ADL Assistance:  (reports he had unclear understanding of recent moreno catheter care, otherwise reports takes care of himself)  Ambulatory Assistance:  (reports mod (I) with cane (not present), shorter community distances)  Precautions:  Precautions  Hearing/Visual Limitations: appears WFL  Medical Precautions: Fall precautions         Objective   Pain:  Pain Assessment  Pain Assessment: 0-10  0-10 (Numeric) Pain Score: 6  Pain Location:  (stomach and (L) hamstring/posterior knee)  Clinical Progression:  (leg pain increases with weightbearing)  Pain Interventions: Repositioned  Response to Interventions:  (appears comfortable at rest)  Cognition:  Cognition  Arousal/Alertness: Appropriate responses to stimuli  Orientation Level: Disoriented to time  Following Commands: Follows one step commands with repetition  Safety Judgment: Decreased awareness of need for assistance  Insight: Mild  Impulsive: Mildly    General Assessments:     Activity Tolerance  Endurance: Tolerates 10 - 20 min exercise with multiple rests    Sensation  Sensation Comment: when asked about N/T, reports has N/T in (L) LE though later states his leg 'jumps\" at times           Coordination  Coordination Comment: overall slow to move, effortful movements    Postural Control  Postural Control: " Impaired  Posture Comment: posterior lean in sitting edge of cart, able to self correct when cued to lean forward    Static Sitting Balance  Static Sitting-Balance Support: Feet supported, Bilateral upper extremity supported  Static Sitting-Level of Assistance: Contact guard    Static Standing Balance  Static Standing-Balance Support: Right upper extremity supported  Static Standing-Level of Assistance: Minimum assistance  Dynamic Standing Balance  Dynamic Standing-Balance Support: Bilateral upper extremity supported  Dynamic Standing-Level of Assistance: Moderate assistance  Dynamic Standing-Comments: (R) hand held assist, (L) touching wall or transport cart (declined to use walker despite despite education on benefits)  Functional Assessments:  ADL  ADL's Addressed:  (declined to change into hospital  socks at this time, wearing his own socks)    Bed Mobility  Bed Mobility: Yes  Bed Mobility 1  Bed Mobility 1: Supine to sitting  Level of Assistance 1: Contact guard, Minimal verbal cues  Bed Mobility Comments 1: Completes slowly, use of bed rail  Bed Mobility 3  Bed Mobility 3: Sitting to supine  Level of Assistance 3: Minimum assistance  Bed Mobility Comments 3: completes slowly, able to bring (B) LE up into bed though needs assist to reposition trunk into middle of bed    Transfers  Transfer: Yes  Transfer 1  Transfer From 1: Bed to  Transfer to 1: Stand  Transfer Level of Assistance 1: Contact guard  Trials/Comments 1: partially stand, pushing off edge of bed (B) hands as pt wanting to attempt on his own  Transfers 2  Transfer From 2: Bed to  Transfer to 2: Stand  Transfer Device 2:  (hand held assist)  Transfer Level of Assistance 2: Minimum assistance, Minimal verbal cues (blocked (R) foot as slipping forward)  Trials/Comments 2: increased time to fully stand upright as leaning legs against edge of bed, completed x 2  Transfers 3  Transfer From 3: Stand to  Transfer to 3: Bed  Transfer Level of Assistance  3: Contact guard, Minimal verbal cues  Trials/Comments 3: completed x 2    Ambulation/Gait Training  Ambulation/Gait Training Performed: Yes  Ambulation/Gait Training 1  Surface 1: Level tile  Device 1:  (hand held assist on (R), wall with (L) hand)  Assistance 1: Moderate assistance, Moderate verbal cues  Quality of Gait 1: Soft knee(s), Antalgic, Decreased step length (step to gait, increased pain with stance phase on (L) LE)  Comments/Distance (ft) 1: 2 feet forward/backward    Extremity/Trunk Assessments:  RLE   RLE : Exceptions to WFL  Strength RLE  RLE Overall Strength: Greater than or equal to 3/5 as evidenced by functional mobility  LLE   LLE : Exceptions to WFL  Strength LLE  LLE Overall Strength:  (grossly 3-/5 at knee/hip)  Treatments:  Bed Mobility  Bed Mobility: Yes  Bed Mobility 2  Bed Mobility  2: Scooting (lateral scoot)  Level of Assistance 2: Minimum assistance, Moderate verbal cues  Bed Mobility Comments 2: x 2    Ambulation/Gait Training  Ambulation/Gait Training Performed: Yes  Ambulation/Gait Training 2  Surface 2: Level tile  Device 2:  (hand held assist on (R), holding edge of bed on (L))  Assistance 2: Minimum assistance, Minimal verbal cues  Quality of Gait 2:  (leaning legs againts edge of bed for support)  Comments/Distance (ft) 2: sliding side steps x 3 to achieve higher position on transport cart to sit  Transfers  Transfer: Yes  Transfers 2  Transfer From 2: Bed to  Transfer to 2: Stand  Transfer Device 2:  (hand held assist)  Transfer Level of Assistance 2: Minimum assistance, Minimal verbal cues (blocked (R) foot as slipping forward)  Trials/Comments 2: increased time to fully stand upright as leaning legs against edge of bed, completed x 2  Transfers 3  Transfer From 3: Stand to  Transfer to 3: Bed  Transfer Level of Assistance 3: Contact guard, Minimal verbal cues  Trials/Comments 3: completed x 2  Outcome Measures:  [facility] Basic Mobility  Turning from your back to your side while  in a flat bed without using bedrails: A little  Moving from lying on your back to sitting on the side of a flat bed without using bedrails: A little  Moving to and from bed to chair (including a wheelchair): A lot  Standing up from a chair using your arms (e.g. wheelchair or bedside chair): A little  To walk in hospital room: A lot  Climbing 3-5 steps with railing: Total  Basic Mobility - Total Score: 14    Encounter Problems       Encounter Problems (Active)       Balance       Will score > 24 on Tinetti to demonstrate low falls risk       Start:  02/25/25    Expected End:  03/11/25               Mobility       STG - Patient will ambulate with CGA-> close (S), LRAD, 50 ft 4..        Start:  02/25/25    Expected End:  03/11/25            STG - Patient will ascend and descend a flight of stairs with min aX1 as feasibly safe to simulate possible home environment.        Start:  02/25/25    Expected End:  03/11/25               PT Transfers       STG - Patient will perform bed mobility with (S).        Start:  02/25/25    Expected End:  03/11/25            STG - Patient will transfer sit to and from stand with CGA-> close (S).        Start:  02/25/25    Expected End:  03/11/25            Will complete TUG in less than 14 seconds to demonstrate lower risk for falls.       Start:  02/25/25    Expected End:  03/11/25            will complete 5x STS in less than 11 seconds to demonstrate lower risk for falls.        Start:  02/25/25    Expected End:  03/11/25                   Education Documentation  Mobility Training, taught by Honey Velasquez, PT at 2/25/2025  5:37 PM.  Learner: Patient  Readiness: Acceptance  Method: Explanation  Response: Needs Reinforcement  Comment: need for assistive device for safe mobility, need for assist, PT POC    02/25/25 at 5:39 PM - Honey Velasquez, PT

## 2025-02-25 NOTE — H&P
HPI     Mr Jarrett is  a 64y male with PMHx: BPH, cocaine/marijuana abuse, urinary obstruction w/moreno who presented to the ED with c/o urine leaking around his catheter.  Per nursing staff leg band was very black and dirty and urine in bag was old and bag was dirty.  Patient states last moreno bag change was a little more than 1 month ago and EMR collaborates this with an ER visit to [facility] 1/6/25 in which patient had decreased urine output with abdominal pain and the moreno was changed out then.  Discussed with patient that he needs to consistently have moreno changed out and needs urology follow up outpatient. Per several EMR notes patient appears to be homeless and non compliant with follow ups.  Per EMR patient should be on Tamsulosin but patient states he never picked up his script because the pharmacy was too far for him to walk to.  His EMR also notes that there was an attempt earlier this month to help patient get placed in a senior living facility but they were unable to contact patient.     While in the ED patients vitals were stable, labs showed a BUN of 133 and Creat 7.15.  Patient to be admitted observation for MANUEL.    ROS/EXAM     Review of Systems   Constitutional: Negative.    HENT: Negative.     Eyes: Negative.    Cardiovascular: Negative.    Gastrointestinal: Negative.    Genitourinary: Negative.         Moreno was leaking around catheter   Musculoskeletal: Negative.    Skin: Negative.    Neurological: Negative.    All other systems reviewed and are negative.    Physical Exam  Vitals reviewed.   Constitutional:       Appearance: Normal appearance.   HENT:      Head: Normocephalic.      Mouth/Throat:      Mouth: Mucous  membranes are dry.   Eyes:      Extraocular Movements: Extraocular movements intact.      Conjunctiva/sclera: Conjunctivae normal.      Pupils: Pupils are equal, round, and reactive to light.   Cardiovascular:      Rate and Rhythm: Normal rate and regular rhythm.      Pulses: Normal pulses.      Heart sounds: Normal heart sounds, S1 normal and S2 normal.   Pulmonary:      Effort: Pulmonary effort is normal.      Breath sounds: Normal breath sounds and air entry.   Abdominal:      General: Abdomen is flat. Bowel sounds are normal.      Palpations: Abdomen is soft.   Genitourinary:     Comments: moreno  Musculoskeletal:         General: Normal range of motion.      Cervical back: Full passive range of motion without pain and normal range of motion.   Skin:     General: Skin is warm and dry.   Neurological:      General: No focal deficit present.      Mental Status: He is alert and oriented to person, place, and time. Mental status is at baseline.   Psychiatric:         Attention and Perception: Attention normal.         Mood and Affect: Mood normal.         Speech: Speech normal.         Histories     Past Medical History:   Diagnosis Date    BPH (benign prostatic hyperplasia)     Substance abuse (Multi)     Urinary obstruction      History reviewed. No pertinent surgical history.  No family history on file.   reports that he has been smoking cigarettes. He has never used smokeless tobacco. He reports current alcohol use. He reports current drug use. Drugs: Marijuana and Cocaine.    Vitals/LABS/RESULTS     Last Recorded Vitals  Blood pressure 105/64, pulse 95, temperature 36.5 °C (97.7 °F), temperature source Oral, resp. rate 18, SpO2 97%.  Intake/Output last 3 Shifts:  No intake/output data recorded.    Relevant Results  Lab Results   Component Value Date    WBC 9.5 02/25/2025    HGB 14.2 02/25/2025    HCT 40.2 (L) 02/25/2025    MCV 80 02/25/2025    PLT 97 (L) 02/25/2025      Lab Results   Component Value Date     GLUCOSE 90 02/25/2025    CALCIUM 9.4 02/25/2025     (L) 02/25/2025    K 5.3 02/25/2025    CO2 16 (L) 02/25/2025    CL 99 02/25/2025     (HH) 02/25/2025    CREATININE 7.15 (H) 02/25/2025     No results found.    Medications     Scheduled medications  heparin (porcine), 5,000 Units, subcutaneous, q8h  nicotine, 1 patch, transdermal, q24h  sennosides-docusate sodium, 2 tablet, oral, BID  tamsulosin, 0.4 mg, oral, Daily      Continuous medications     PRN medications      PLAN     Mr Jarrett is  a 64y male with PMHx: BPH, cocaine/marijuana abuse, urinary obstruction w/moreno who presented to the ED with c/o urine leaking around his catheter.  Per nursing staff leg band was very black and dirty and urine in bag was old and bag was dirty.  Patient states last moreno bag change was a little more than 1 month ago and EMR collaborates this with an ER visit to [facility] 1/6/25 in which patient had decreased urine output with abdominal pain and the moreno was changed out then.  Discussed with patient that he needs to consistently have moreno changed out and needs urology follow up outpatient. Per several EMR notes patient appears to be homeless and non compliant with follow ups.  Per EMR patient should be on Tamsulosin but patient states he never picked up his script because the pharmacy was too far for him to walk to.  His EMR also notes that there was an attempt earlier this month to help patient get placed in a senior living facility but they were unable to contact patient.     While in the ED patients vitals were stable, labs showed a BUN of 133 and Creat 7.15.  Patient to be admitted observation for JULITO.  Assessment/Plan:    Urinary Retention 2/2 BPH  Julito  UTI  -moreno changed out in the ED  -Bun/Creat on arrival 133/7.15 respectively  -will repeat BMP today and daily RFP  -fluids 250cc/hr LR IV  -strict I/O  -Renal ultrasound  -continue Ceftriaxone 1gm IV started in the ED  -continue home flomax 0.4mg daily-- Will need  homegoing script  -needs set up with outpatient urology appt prior to discharge  -needs set up with outpatient moreno exchange  -would benefit from Healthy at home but patient doesn't have stable housing    Malnutrition  -will consult dietitian  -ensure plus with meals    Weakness  Pt/Ot eval and treat    Housing insecurity  Food insecurity  -will consult SW      Fluids: monitor and replete as needed  Electrolytes: monitor and replete as needed  Nutrition: Regular diet   GI prophylaxis: as needed  DVT prophylaxis: SCD  Code Status:   PCP  Pharmacy    Disposition:  Admitted for above diagnoses. Plan per above. Anticipate observation stay      Debora Willingham, APRN-CNP         I spent 75 minutes in the professional and overall care on this encounter before, during and after the visit, examining the patient, reviewing labs, writing orders and documenting the note

## 2025-02-25 NOTE — PROGRESS NOTES
"Gaetano Jarrett is a 64 y.o. male     Assessment/Plan   Pt is alert and oriented x3. He said he is in ED to change his catheter. Pt stated he went to [facility] just over a month ago to the ER for his catheter to be changed. Pt said he was supposed to go to \"[facility]\" to get his catheter changed. SW explained to Pt that this is his insurance and not a provider. Pt Recalled he had a PCP, Denise Mandujano, from [facility] \"when\" he was able to \"walk.\" Pt cannot recall when he last saw his PCP.     SW asked him what was the outcome of his last ER visit and why he doesn't have services following at home.  Pt said he didn't want services coming in bc his home was not his own. SW asked if they had pets in the home that might be a barrier, and he stated they no longer have the two pitbulls that were in the residence. Pt said the home is \"under construction\" and is \"not up to date.\" Pt stated this was an additional reason he wouldn't let services into the home.  He said the apartment is his \"girl's\" daughter's home. He lives with his SO, her son, and her daughter.   Pt said he moved in to help his SO as she is diagnosed with dementia. He explained his girlfriend's son and daughter both work. SW inquired with Pt if his wife has ever wandered and he admitted she did once leave the home.Pt said he had a cane in the past but no longer has one. He said he last got up to use the bathroom last Thursday but has not had a bowel movement since then. Pt said he mainly \"drinks water.\"   SW inquired with Pt if he would be receptive to rehab and he said he felt as though he needed it.   CALLI consulted MD and passed off request to follow for placement to CALLI FARRELL Spoke with Pt once more to clarify about home environment. Pt states he lives with his girlfriend Linda Cuevas, her daughter Devora Cuevas, and son Pete Cuevas. Devora reportedly works 10am until 11pm and Pete works 8am until 5pm. SW asked Pt how he obtains food in " "their absence. He said he used to cook in the microwave downstairs but he said they stopped doing this due to loss of running water. He said they have \"bad pipes\" that have been \"leaking.\" Pt said they keep a bag of bread  in the closet and lunch meat in a fridge on the the third floor. Pt said he sent his girlfriend upstairs to get their food most recently. Pt said first their furnace went out so they use space heaters. He said then their water \"went out\" and it's \"leaking everywhere\" if they run it. Pt said they have to bring buckets of water from the basement up to the toilet. Pt described the conditions as \"not moralizing\" as he said the toilet can be \"full of shit.\"Pt expressed reluctance to share this stating concern this will be reported.  He said he was trying to \"help out\" Devora by living their. He recalled initially when he moved in he would take out trash, do dishes and sweep. He can no longer perform these task.  Spoke with MD who stated IP team expressed [facility] records reflect housing insecurity. CALLI reviewed [facility] records to review this and observed this was verified in records. To obtain more collateral, CALLI called Pt's contact Pete. He confirmed Pt lives with him \"for now.\" They reportedly reside at [address] [place] [address]. He expressed Pt has \"always been thin.\" He maintained Pt and his mom have access to food. CALLI inquired if the family needed support as Pt said their was an issue with heat and lack of running water. Pete expressed reluctance to discuss this and so CALLI clairifed she could provide resources if needed. Pete hung up call with this CALLI.  Report given to CALLI Curry to follow. CALLI notes substance abuse noted in [facility] records as well. Spoke with Leonila at the [facility] due to concerns for self neglect and safety issues in the residence. Call reported at 6:26pm. Her ID is [id number].     FIDE Mccabe      "

## 2025-02-25 NOTE — ED TRIAGE NOTES
Continue with healthy diet and exercise    Orders:    CBC and differential; Future    Comprehensive metabolic panel; Future    Lipid Panel with Direct LDL reflex; Future     Patient brought in by EMS due to patient's moreno cath not draining properly. Patient states he remembering it being changed about a month ago, but isn't sure exactly when. Patient reports he has had a chronic moreno since an accident 2 years ago. Patient endorses being admitted to the hospital previously for malnourishment. Patient denies any recent falls but states he does not walk and doesn't leave the house much.

## 2025-02-25 NOTE — PROGRESS NOTES
Gaetano Jarrett is a 64 y.o. male on day 0 of admission presenting with MANUEL (acute kidney injury) (CMS-HCC).    Social Work Note; conversation regarding discharge planning.  I met with patient after his PT eval.  He is aware that SNF is the recommendation.  He reported that he spent 3 months at [facility] and would agree to return there.  Attempt made to initiate [facility] and this facility does not show up as an option.  Called the facility, [phone], however admissions was gone for the night.  Referral will need to be made tomorrow    Updated clinical team    Patricio Perez LCSW

## 2025-02-26 LAB
ALBUMIN SERPL BCP-MCNC: 3.2 G/DL (ref 3.4–5)
ANION GAP SERPL CALC-SCNC: 13 MMOL/L (ref 10–20)
BACTERIA UR CULT: NO GROWTH
BUN SERPL-MCNC: 53 MG/DL (ref 6–23)
CALCIUM SERPL-MCNC: 8.6 MG/DL (ref 8.6–10.6)
CHLORIDE SERPL-SCNC: 109 MMOL/L (ref 98–107)
CO2 SERPL-SCNC: 22 MMOL/L (ref 21–32)
CREAT SERPL-MCNC: 1.59 MG/DL (ref 0.5–1.3)
EGFRCR SERPLBLD CKD-EPI 2021: 48 ML/MIN/1.73M*2
ERYTHROCYTE [DISTWIDTH] IN BLOOD BY AUTOMATED COUNT: 13.4 % (ref 11.5–14.5)
GLUCOSE BLD MANUAL STRIP-MCNC: 109 MG/DL (ref 74–99)
GLUCOSE BLD MANUAL STRIP-MCNC: 117 MG/DL (ref 74–99)
GLUCOSE BLD MANUAL STRIP-MCNC: 126 MG/DL (ref 74–99)
GLUCOSE BLD MANUAL STRIP-MCNC: 138 MG/DL (ref 74–99)
GLUCOSE SERPL-MCNC: 99 MG/DL (ref 74–99)
HCT VFR BLD AUTO: 34.8 % (ref 41–52)
HGB BLD-MCNC: 11.9 G/DL (ref 13.5–17.5)
MCH RBC QN AUTO: 27.9 PG (ref 26–34)
MCHC RBC AUTO-ENTMCNC: 34.2 G/DL (ref 32–36)
MCV RBC AUTO: 82 FL (ref 80–100)
NRBC BLD-RTO: 0 /100 WBCS (ref 0–0)
PHOSPHATE SERPL-MCNC: 2.1 MG/DL (ref 2.5–4.9)
PLATELET # BLD AUTO: 89 X10*3/UL (ref 150–450)
POTASSIUM SERPL-SCNC: 4.4 MMOL/L (ref 3.5–5.3)
RBC # BLD AUTO: 4.26 X10*6/UL (ref 4.5–5.9)
SODIUM SERPL-SCNC: 140 MMOL/L (ref 136–145)
WBC # BLD AUTO: 6.3 X10*3/UL (ref 4.4–11.3)

## 2025-02-26 PROCEDURE — 96372 THER/PROPH/DIAG INJ SC/IM: CPT | Performed by: NURSE PRACTITIONER

## 2025-02-26 PROCEDURE — 2500000001 HC RX 250 WO HCPCS SELF ADMINISTERED DRUGS (ALT 637 FOR MEDICARE OP): Mod: SE | Performed by: NURSE PRACTITIONER

## 2025-02-26 PROCEDURE — 2500000004 HC RX 250 GENERAL PHARMACY W/ HCPCS (ALT 636 FOR OP/ED): Mod: SE | Performed by: NURSE PRACTITIONER

## 2025-02-26 PROCEDURE — 1100000001 HC PRIVATE ROOM DAILY

## 2025-02-26 PROCEDURE — 36415 COLL VENOUS BLD VENIPUNCTURE: CPT | Performed by: NURSE PRACTITIONER

## 2025-02-26 PROCEDURE — 97165 OT EVAL LOW COMPLEX 30 MIN: CPT | Mod: GO

## 2025-02-26 PROCEDURE — 2500000002 HC RX 250 W HCPCS SELF ADMINISTERED DRUGS (ALT 637 FOR MEDICARE OP, ALT 636 FOR OP/ED): Mod: SE | Performed by: NURSE PRACTITIONER

## 2025-02-26 PROCEDURE — 99232 SBSQ HOSP IP/OBS MODERATE 35: CPT | Performed by: STUDENT IN AN ORGANIZED HEALTH CARE EDUCATION/TRAINING PROGRAM

## 2025-02-26 PROCEDURE — 85027 COMPLETE CBC AUTOMATED: CPT | Performed by: NURSE PRACTITIONER

## 2025-02-26 PROCEDURE — 51702 INSERT TEMP BLADDER CATH: CPT

## 2025-02-26 PROCEDURE — 80069 RENAL FUNCTION PANEL: CPT | Performed by: NURSE PRACTITIONER

## 2025-02-26 PROCEDURE — 82947 ASSAY GLUCOSE BLOOD QUANT: CPT

## 2025-02-26 RX ADMIN — SODIUM CHLORIDE, POTASSIUM CHLORIDE, SODIUM LACTATE AND CALCIUM CHLORIDE 250 ML/HR: 600; 310; 30; 20 INJECTION, SOLUTION INTRAVENOUS at 04:22

## 2025-02-26 RX ADMIN — HEPARIN SODIUM 5000 UNITS: 5000 INJECTION, SOLUTION INTRAVENOUS; SUBCUTANEOUS at 14:04

## 2025-02-26 RX ADMIN — HEPARIN SODIUM 5000 UNITS: 5000 INJECTION, SOLUTION INTRAVENOUS; SUBCUTANEOUS at 05:23

## 2025-02-26 RX ADMIN — TAMSULOSIN HYDROCHLORIDE 0.4 MG: 0.4 CAPSULE ORAL at 09:07

## 2025-02-26 RX ADMIN — CEFTRIAXONE SODIUM 1 G: 1 INJECTION, SOLUTION INTRAVENOUS at 09:09

## 2025-02-26 RX ADMIN — SENNOSIDES AND DOCUSATE SODIUM 2 TABLET: 50; 8.6 TABLET ORAL at 09:07

## 2025-02-26 RX ADMIN — HEPARIN SODIUM 5000 UNITS: 5000 INJECTION, SOLUTION INTRAVENOUS; SUBCUTANEOUS at 21:01

## 2025-02-26 RX ADMIN — SENNOSIDES AND DOCUSATE SODIUM 2 TABLET: 50; 8.6 TABLET ORAL at 21:02

## 2025-02-26 ASSESSMENT — COGNITIVE AND FUNCTIONAL STATUS - GENERAL
PERSONAL GROOMING: A LITTLE
DAILY ACTIVITIY SCORE: 16
DRESSING REGULAR UPPER BODY CLOTHING: A LITTLE
HELP NEEDED FOR BATHING: A LOT
TOILETING: A LOT
DRESSING REGULAR LOWER BODY CLOTHING: A LOT

## 2025-02-26 ASSESSMENT — PAIN SCALES - GENERAL
PAINLEVEL_OUTOF10: 4
PAINLEVEL_OUTOF10: 4
PAINLEVEL_OUTOF10: 0 - NO PAIN

## 2025-02-26 ASSESSMENT — ACTIVITIES OF DAILY LIVING (ADL)
ADL_ASSISTANCE: INDEPENDENT
BATHING_ASSISTANCE: MAXIMAL

## 2025-02-26 NOTE — PROGRESS NOTES
Pharmacy Medication History Review    Gaetano Jarrett is a 64 y.o. male admitted for MANUEL (acute kidney injury) (CMS-[facility]). Pharmacy reviewed the patient's [id number] medications and allergies for accuracy.    The list below reflects the updated PTA list.   Prior to Admission Medications   Prescriptions  Chart / Pharmacy Reported?   nicotine (Nicoderm CQ) 21 mg/24 hr patch  Yes   Sig: Place 1 patch on the skin once daily.   tamsulosin (Flomax) 0.4 mg 24 hr capsule  Yes   Sig: Take 1 capsule (0.4 mg) by mouth once daily.  --> No recent fills. Last Fill 6/26/24, #30 / 30 day.              The list below reflects the updated allergy list. Please review each documented allergy for additional clarification and justification.  Allergies  Reviewed by Debora Willingham, APRN-CNP on 2/25/2025   No Known Allergies       Meds 2 Bed marked as accepted for discharge.  Reassess appropriate choice depending on plan.  Last Local Pharmacy per Dispense Report:  [id number] -   [place] [id number]   AT [facility] [address]   P: [phone]     Sources used to complete the med history include:  Current [facility] Ambulatory/PTA Medication List  [facility] Dispense Report (Pharmacy Fill Activity)  [facility], Clinical Summary (Active Medications)  [facility], Discharge Summary 9/3/24  Chart Review; Recent/Past Encounters  [facility] (no recent activity found)    Below are additional concerns with the patient's PTA list:  No changes made to current Epic PTA Medication List. Per chart review, history of Eliquis 5 mg BID (DVT, [facility] Discharge 9/3/24); however no recent or active fill history found per Dispense Report.     ----------------------------------  Santana Barney, Claudia, [facility]  Transitions of Care Pharmacist  Medication reconciliation complete  Please reach out via Epic Secure Chat (7p-0y) for questions,   or if no response call [phone].

## 2025-02-26 NOTE — CARE PLAN
The patient's goals for the shift include      The clinical goals for the shift include Patient  will have a urine output of 30mls/hr and be free from falls throughout this shift .    Over the shift, the patient did not make progress toward the following goals.

## 2025-02-26 NOTE — PROGRESS NOTES
Gaetano Jarrett is a 64 y.o. male on day 0 of admission presenting with MANUEL (acute kidney injury) (CMS-HCC).    [facility] accepted patient. PASSR completed in [facility]. SNF will start auth today to assist with dc needs.     Dispo: Patient pending precert with [facility]. SW updated patient on discharge plan and pending auth. Patient agreeable for dc to SNF.

## 2025-02-26 NOTE — PROGRESS NOTES
INTERNAL MEDICINE PROGRESS NOTE     BRIEF NARRATIVE      Gaetano Jarrett is a 64 y.o. male on day 0 of admission presenting with MANUEL (acute kidney injury) (CMS-[facility]).    Pt with PMHx: BPH, cocaine/marijuana abuse, urinary obstruction w/moreno who presented to the ED with c/o urine leaking around his catheter.  Per nursing staff leg band was very black and dirty and urine in bag was old and bag was dirty.  Patient states last moreno bag change was a little more than 1 month ago and EMR collaborates this with an ER visit to [facility] 1/6/25 in which patient had decreased urine output with abdominal pain and the moreno was changed out then.  Discussed with patient that he needs to consistently have moreno changed out and needs urology follow up outpatient. Per several EMR notes patient appears to be homeless and non compliant with follow ups.  Per EMR patient should be on Tamsulosin but patient states he never picked up his script because the pharmacy was too far for him to walk to.  His EMR also notes that there was an attempt earlier this month to help patient get placed in a senior living facility but they were unable to contact patient.      While in the ED patients vitals were stable, labs showed a BUN of 133 and Creat 7.15.  Patient to be admitted observation for MANUEL.    ASSESSMENT / PLANS      Urinary Retention 2/2 BPH  MANUEL- likely obstructive uropathy - significantly improving  UTI  Mild hydronephrosis   -moreno changed out in the ED  -Bun/Creat on arrival 133/7.15 respectively. Repeat after moreno replacement Cr 5.82.   -Cr 1.59 today, confirmed the obstructive nature   -fluids 250cc/hr LR IV for 1 day  -strict I/O  -Renal ultrasound 2/25 showed  dilation of right renal pelvis suggestive of mild hydronephrosis. Will consider CT if creatinine not improving   -continue Ceftriaxone 1gm IV started in the ED  -continue home flomax 0.4mg daily-- Will need homegoing script  -needs outpatient urology referral appt prior to discharge  -needs set up with outpatient moreno exchange  -would benefit from Healthy at home but patient doesn't have stable housing     Generalized weakness   - Pt/Ot  recommending moderate activity after discharge   - will need placement    Mod to severe protein caloric malnutrition  -consult dietitian  -ensure plus with meals     Housing insecurity  Food insecurity  -will consult SW     Fluids: monitor and replete as needed  Electrolytes: monitor and replete as needed  Nutrition: Regular diet   GI prophylaxis: as needed  DVT prophylaxis: SCD  Code Status:   PCP  Pharmacy       SUBJECTIVE       Pt seen and examined today, resting in bed in NAD.     OBJECTIVE      Visit Vitals  /77   Pulse 95   Temp 37 °C (98.6 °F)   Resp 18        Intake/Output Summary (Last 24 hours) at 2/26/2025 0810  Last data filed at 2/26/2025 0600  Gross per 24 hour   Intake 3970.83 ml   Output 5650 ml   Net -1679.17 ml       Physical Exam   Constitutional:       Appearance: Normal appearance.   HENT:      Head: Normocephalic.      Mouth/Throat:      Mouth: Mucous membranes are dry.   Eyes:      Extraocular Movements: Extraocular movements intact.      Conjunctiva/sclera: Conjunctivae normal.      Pupils: Pupils are equal, round, and reactive to light.   Cardiovascular:      Rate and Rhythm: Normal rate and regular rhythm.      Pulses: Normal pulses.      Heart sounds: Normal heart sounds, S1 normal and S2 normal.   Pulmonary:      Effort: Pulmonary effort is normal.      Breath sounds: Normal breath sounds and air entry.   Abdominal:      General: Abdomen is flat. Bowel sounds are normal.      Palpations: Abdomen is soft.   Genitourinary:     Comments:  moreno  Musculoskeletal:         General: Normal range of motion.      Cervical back: Full passive range of motion without pain and normal range of motion.   Skin:     General: Skin is warm and dry.   Neurological:      General: No focal deficit present.      Mental Status: He is alert and oriented to person, place, and time. Mental status is at baseline.   Psychiatric:         Attention and Perception: Attention normal.         Mood and Affect: Mood normal.         Speech: Speech normal.     Current Meds   cefTRIAXone, 1 g, intravenous, Daily  heparin (porcine), 5,000 Units, subcutaneous, q8h  nicotine, 1 patch, transdermal, q24h  sennosides-docusate sodium, 2 tablet, oral, BID  tamsulosin, 0.4 mg, oral, Daily            LABS and IMAGING     WBC   Date Value Ref Range Status   02/26/2025 6.3 4.4 - 11.3 x10*3/uL Final   02/25/2025 9.5 4.4 - 11.3 x10*3/uL Final     Hemoglobin   Date Value Ref Range Status   02/26/2025 11.9 (L) 13.5 - 17.5 g/dL Final   02/25/2025 14.2 13.5 - 17.5 g/dL Final     Hematocrit   Date Value Ref Range Status   02/26/2025 34.8 (L) 41.0 - 52.0 % Final   02/25/2025 40.2 (L) 41.0 - 52.0 % Final     Bicarbonate   Date Value Ref Range Status   02/25/2025 18 (L) 21 - 32 mmol/L Final   02/25/2025 16 (L) 21 - 32 mmol/L Final     Creatinine   Date Value Ref Range Status   02/25/2025 5.82 (H) 0.50 - 1.30 mg/dL Final   02/25/2025 7.15 (H) 0.50 - 1.30 mg/dL Final     Calcium   Date Value Ref Range Status   02/25/2025 9.1 8.6 - 10.6 mg/dL Final   02/25/2025 9.4 8.6 - 10.6 mg/dL Final       US renal complete  Narrative: Interpreted By:  Sam Mojica  and Nu Almazan   STUDY:  US RENAL COMPLETE;  2/25/2025 4:18 pm      INDICATION:  Signs/Symptoms:elevated Bun/Creat   r/o hydronephrosis or other  obstruction.      COMPARISON:  None.      ACCESSION NUMBER(S):  [id number]      ORDERING CLINICIAN:  VANESSA PEPE      TECHNIQUE:  Multiple images of the kidneys were obtained  .      FINDINGS:  RIGHT  KIDNEY:  The right kidney measures 10.8 cm in length. The renal cortical  echogenicity and thickness are within normal limits. There is  dilation of the right renal pelvis. Ureters are not well-visualized.  No evidence of nephrolithiasis.      LEFT KIDNEY:  The left kidney measures 10.4 cm in length. The renal cortical  echogenicity and thickness are within normal limits. No  hydronephrosis is present; no evidence of nephrolithiasis.      BLADDER:  Dan catheter in place with a partially decompressed bladder. There  are debris within the bladder.      Impression: Dilation of the right renal pelvis suggestive of mild hydronephrosis.  The ureters are not well-visualized. If there is concern for an  obstructive process, consider further evaluation with CT.      Normal appearance of the left renal parenchyma.      There is debris within the bladder. Recommend correlation with  urinalysis and Dan output to rule out cystitis and hemorrhagic  components.      MACRO:  None      Signed by: Sam Mojica 2/25/2025 7:04 PM  Dictation workstation:   [id number]         PROBLEM LISTS      Problem List Items Addressed This Visit          Genitourinary and Reproductive    * (Principal) MANUEL (acute kidney injury) (CMS-[facility]) - Primary     Other Visit Diagnoses       Obstruction of Dan catheter, initial encounter (CMS-HCC)                This dictation was created with voice recognition software. Although every effort is made to review the dictation as it is transcribed, on occasion spoken words, phrases, names, numbers, punctuation etc can be misinterpreted by the the technology leading to omissions or inappropriate outcomes.     Frida Julien MD

## 2025-02-26 NOTE — CONSULTS
"Nutrition Initial Assessment:   Nutrition Assessment    Reason for Assessment: Admission nursing screening, Provider consult order (Reason for consult? Answer: Nutrition assessment/recomendation)  Malnutrition Screening Tool (MST)  Have you recently lost weight without trying?: Yes  If yes, how much weight have you lost?: Lost 14 - 23 pounds  Weight Loss Score: 2  Have you been eating poorly because of a decreased appetite?: Yes  Malnutrition Score: 3    Patient is a 64 y.o. male presenting with c/o urine leaking around his catheter.   admitted observation for MANUEL.     PMHx: BPH, cocaine/marijuana abuse, urinary obstruction w/moreno     Nutrition History:  Food and Nutrient History: Patient reports he typically eats smaller meals over the day such as peanut butter crackers, cereal, minimal vegetables but likes cooked carrots, some fruits but not bananas, likes meats and some dairy such as milk and cheese.  Patient described himself as a picky eater and feels sleepy if he eats too much at once.  He typically drinks 4-5 Ensure shakes per day and prefers strawberry flavor.  Vitamin/Herbal Supplement Use: none       Anthropometrics:  Height: 167.6 cm (5' 6\")   Weight: 52.2 kg (115 lb)   BMI (Calculated): 18.57  IBW/kg (Dietitian Calculated): 64.5 kg  Percent of IBW: 81 %       Weight History:   Wt Readings from Last 101 Encounters:   02/25/25 52.2 kg (115 lb)   04/04/24 55 kg (121 lb 4.1 oz)       Weight Change %:  Weight History / % Weight Change: Patient reports he tries to gain weight but has been unable to get above 115-120#.  Patient reports in the past he weighed ~170#.  Significant Weight Loss:  (Hx of significant loss now chronic low body weight for height)    Nutrition Focused Physical Exam Findings:    Subcutaneous Fat Loss:   Orbital Fat Pads: Severe (dark circles, hollowing and loose skin)  Buccal Fat Pads: Severe (hollow, sunken and narrow face)  Triceps: Severe (negligible fat tissue)  Ribs: Defer  Muscle " Wasting:  Temporalis: Severe (hollowed scooping depression)  Pectoralis (Clavicular Region): Severe (protruding prominent clavicle)  Deltoid/Trapezius: Severe (squared shoulders, acromion process prominent)  Interosseous: Severe (depressed area between thumb and forefinger)  Trapezius/Infraspinatus/Supraspinatus (Scapular Region): Defer  Quadriceps: Severe (depressions on inner and outer thigh)  Gastrocnemius: Severe (minimal muscle definition)  Edema:     Physical Findings:       Nutrition Significant Labs:  CBC Trend:   Results from last 7 days   Lab Units 02/26/25  0518 02/25/25  1058   WBC AUTO x10*3/uL 6.3 9.5   RBC AUTO x10*6/uL 4.26* 5.05   HEMOGLOBIN g/dL 11.9* 14.2   HEMATOCRIT % 34.8* 40.2*   MCV fL 82 80   PLATELETS AUTO x10*3/uL 89* 97*    , Renal Lab Trend:   Results from last 7 days   Lab Units 02/26/25  0517 02/25/25  1356 02/25/25  1058   POTASSIUM mmol/L 4.4 4.3 5.3   PHOSPHORUS mg/dL 2.1*  --   --    SODIUM mmol/L 140 136 132*   EGFR mL/min/1.73m*2 48* 10* 8*   BUN mg/dL 53* 123* 133*   CREATININE mg/dL 1.59* 5.82* 7.15*        Nutrition Specific Medications:  Scheduled medications  cefTRIAXone, 1 g, intravenous, Daily  heparin (porcine), 5,000 Units, subcutaneous, q8h  nicotine, 1 patch, transdermal, q24h  sennosides-docusate sodium, 2 tablet, oral, BID  tamsulosin, 0.4 mg, oral, Daily      Continuous medications  lactated Ringer's, 250 mL/hr, Last Rate: 250 mL/hr (02/26/25 0422)      I/O:   Last BM Date: 02/25/25; Stool Appearance: Loose (02/25/25 2207)    Dietary Orders (From admission, onward)       Start     Ordered    02/25/25 2150  May Participate in Room Service  ( ROOM SERVICE MAY PARTICIPATE)  Once        Question:  .  Answer:  Yes    02/25/25 2149 02/25/25 1516  Oral nutritional supplements  Until discontinued        Question Answer Comment   Deliver with All meals    Select supplement: Ensure Plus        02/25/25 1515    02/25/25 1355  Adult diet Regular  Diet effective now         Question:  Diet type  Answer:  Regular    02/25/25 5435                     Estimated Needs:   Total Energy Estimated Needs in 24 hours (kCal): 2000 kCal  Method for Estimating Needs: 30kcal/kg/IBW  Total Protein Estimated Needs in 24 Hours (g): 70 g  Method for Estimating 24 Hour Protein Needs: 1g/kg/IBW              Nutrition Diagnosis   Malnutrition Diagnosis  Patient has Malnutrition Diagnosis: Yes  Diagnosis Status: New  Malnutrition Diagnosis: Severe malnutrition related to chronic disease or condition (and likely r/t environmental circumstances)  As Evidenced by: suboptimal/poor quality diet based on diet hx, hx of weight loss/chronic low body weight (81% DBW/BMI 18.5) and severe degree of muscle wasting            Nutrition Interventions/Recommendations        Nutrition Recommendations:  Individualized Nutrition Prescription Provided for : 1) Continue Regular diet and Ensure plus TID.  2) Daily MVI with minerals.    Nutrition Interventions/Goals:      Additional Interventions: Encouraged patient going forward to eat at least 3 meals everyday along with Ensure shakes TID to promote adequate intake and weight gain.          Nutrition Monitoring and Evaluation   Food/Nutrient Related History Monitoring  Monitoring and Evaluation Plan: Estimated Energy Intake  Estimated Energy Intake: Energy intake greater or equal to 75% of estimated energy needs    Anthropometric Measurements  Monitoring and Evaluation Plan: Body weight  Body Weight: Body weight - Promote weight restoration              Goal Status: New goal(s) identified    Time Spent (min): 60 minutes

## 2025-02-26 NOTE — PROGRESS NOTES
Google [facility] and called the facility.  The name has been changed to [employer] [facility].  Sent referral will provide pt with list for addition selections.  Care transitions will continue to follow for discharge planning needs.  Summer VILLAGOMEZ, LIS (ex [id number])

## 2025-02-26 NOTE — CARE PLAN
The patient's goals for the shift include      The clinical goals for the shift include Patient  will have a urine output of 30mls/hr and be free from falls throughout this shift .    Over the shift, the patient did make progress toward the following goals.

## 2025-02-26 NOTE — PROGRESS NOTES
Occupational Therapy    Evaluation    Patient Name: Gaetano Jarrett  MRN: [id number]  Today's Date: 2/26/2025  [facility]  Time Calculation  Start Time: 1127  Stop Time: 1146  Time Calculation (min): 19 min    Assessment  IP OT Assessment  OT Assessment: Pt demos deficits in activity tolerance, balance, strength, and mobility resulting in the need for increased A w/ I/ADLs and a need for continued skilled OT services at MOD intensity to allow for a safe and functional d/c.  Prognosis: Good  Barriers to Discharge Home: Caregiver assistance, Physical needs, Social Drivers of Health considerations  Caregiver Assistance: Caregiver assistance needed per identified barriers - however, level of patient's required assistance exceeds assistance available at home  Physical Needs: 24hr mobility assistance needed, 24hr ADL assistance needed, High falls risk due to function or environment  Social Drivers of Health Considerations: Housing insecurity, Socioeconomic concerns, Transportation access is limited/unsafe/unreliable, Proper DME access is limited/unavailable  Evaluation/Treatment Tolerance: Treatment limited secondary to medical complications (Comment)  End of Session Communication: Bedside nurse  End of Session Patient Position: Bed, 3 rail up (RN w/ pt in room.)  Plan:  Inpatient Plan  Treatment Interventions: ADL retraining, Functional transfer training, Endurance training, Patient/family training, Equipment evaluation/education, Compensatory technique education, Neuromuscular reeducation  OT Frequency: 3 times per week  OT Discharge Recommendations: Moderate intensity level of continued care  Equipment Recommended upon Discharge: Wheeled walker  OT - OK to Discharge: Yes  OT Assessment  OT Assessment Results: Decreased ADL status, Decreased safe judgment during ADL, Decreased endurance, Decreased functional mobility  Prognosis: Good  Barriers to Discharge: Decreased caregiver support  Evaluation/Treatment Tolerance:  Treatment limited secondary to medical complications (Comment)  Strengths: Attitude of self  Barriers to Participation: Comorbidities    Subjective   Current Problem:  1. MANUEL (acute kidney injury) (CMS-HCC)        2. Obstruction of Moreno catheter, initial encounter (CMS-HCC)          General:  Reason for Referral: This 63 y/o M presented to ED 2/25 d/t chronic moreno not draining properly  Past Medical History Relevant to Rehab: BPH, cocaine/marijuana abuse, urinary obstruction w/ moreno  Prior to Session Communication: Bedside nurse  General Comment: Pt sup in bed on approach. Pleasant and agreeable to OT assessment. During session blood noted in foly line, RN aware and w/ pt at end of session.   Precautions:  Medical Precautions: Fall precautions  Pain:  Pain Assessment  0-10 (Numeric) Pain Score: 4  Pain Type: Acute pain  Pain Location: Abdomen  Clinical Progression:  (LLE pain behind knee)  Pain Interventions: Repositioned  Lines/Tubes/Drains:  Urethral Catheter Coude 18 Fr. (Active)   Number of days: 1         Objective   Cognition:  Overall Cognitive Status: Impaired  Arousal/Alertness: Appropriate responses to stimuli  Orientation Level: Disoriented to time  Safety Judgment: Good awareness of safety precautions  Insight: Mild  Impulsive: Mildly  Home Living:  Home Living Comments: Pt reports living in a house w/ his GF and her two adult children. Pt unclear about ELENO (possibly 4+5). Pt states he had a SPC, however no longer does and would like a new one   Prior Function:  ADL Assistance: Independent  Ambulatory Assistance: Independent (w/ SPC)  Prior Function Comments: Pt is a questionable historian, however reports funtional IND prior to admission.  ADL:  Eating Assistance: Independent  Grooming Assistance: Stand by  Bathing Assistance: Maximal  UE Dressing Assistance: Stand by  LE Dressing Assistance: Maximal  Toileting Assistance with Device: Maximal  Activity Tolerance:  Endurance: Tolerates 10 - 20 min  exercise with multiple rests  Bed Mobility/Transfers: Bed Mobility  Bed Mobility: Yes  Bed Mobility 1  Bed Mobility 1: Supine to sitting  Level of Assistance 1: Contact guard  Bed Mobility Comments 1: HOB elevated and w/ extended time  Bed Mobility 2  Bed Mobility  2: Sitting to supine  Level of Assistance 2: Minimum assistance, Moderate verbal cues  Bed Mobility 3  Bed Mobility 3: Scooting  Level of Assistance 3: Contact guard  Bed Mobility Comments 3: HOB flat and VC for sequencing.   and Transfers  Transfer: Yes  Transfer 1  Technique 1: Sit to stand, Stand to sit  Transfer Level of Assistance 1: Minimum assistance  Vision: Vision - Basic Assessment  Current Vision: Wears glasses all the time  Sensation:  Sensation Comment: No apparent deficits  Coordination:  Movements are Fluid and Coordinated: Yes   Hand Function:  Hand Function  Gross Grasp: Functional  Coordination: Functional  Extremities: RUE   RUE : Within Functional Limits, LUE   LUE: Within Functional Limits  Outcome Measures: [facility] Daily Activity  Putting on and taking off regular lower body clothing: A lot  Bathing (including washing, rinsing, drying): A lot  Putting on and taking off regular upper body clothing: A little  Toileting, which includes using toilet, bedpan or urinal: A lot  Taking care of personal grooming such as brushing teeth: A little  Eating Meals: None  Daily Activity - Total Score: 16         ,     OT Adult Other Outcome Measures  4AT: 3- possible cog impairment    Education Documentation  Body Mechanics, taught by Jaclyn Sanchez OT at 2/26/2025  1:48 PM.  Learner: Patient  Readiness: Acceptance  Method: Explanation  Response: Needs Reinforcement    Precautions, taught by Jaclyn Sanchez OT at 2/26/2025  1:48 PM.  Learner: Patient  Readiness: Acceptance  Method: Explanation  Response: Needs Reinforcement    ADL Training, taught by Jaclyn Sanchez OT at 2/26/2025  1:48 PM.  Learner: Patient  Readiness: Acceptance  Method:  Explanation  Response: Needs Reinforcement    Education Comments  No comments found.        Goals:     Encounter Problems       Encounter Problems (Active)       ADLs       Patient will perform UB and LB bathing with minimal assist  level of assistance and shower chair.       Start:  02/26/25    Expected End:  03/12/25            Patient with complete lower body dressing with minimal assist  level of assistance donning and doffing all LE clothes  with PRN adaptive equipment while edge of bed        Start:  02/26/25    Expected End:  03/12/25            Patient will complete toileting including hygiene clothing management/hygiene with moderate assist level of assistance and raised toilet seat.       Start:  02/26/25    Expected End:  03/12/25               MOBILITY       Patient will perform Functional mobility mod  Household distances/Community Distances with contact guard assist level of assistance and least restrictive device in order to improve safety and functional mobility.       Start:  02/26/25    Expected End:  03/12/25               TRANSFERS       Patient will complete functional transfer to chair/toilet with least restrictive device with minimal assist  level of assistance.       Start:  02/26/25    Expected End:  03/12/25 02/26/25 at 1:48 PM   YULISSA BRAN, OT   Rehab Office: [phone]

## 2025-02-26 NOTE — CARE PLAN
Problem: Skin  Goal: Decreased wound size/increased tissue granulation at next dressing change  Outcome: Progressing  Goal: Participates in plan/prevention/treatment measures  Outcome: Progressing  Goal: Prevent/manage excess moisture  Outcome: Progressing  Goal: Prevent/minimize sheer/friction injuries  Outcome: Progressing  Goal: Promote/optimize nutrition  Outcome: Progressing  Flowsheets (Taken 2/26/2025 0603)  Promote/optimize nutrition: Monitor/record intake including meals  Goal: Promote skin healing  Outcome: Progressing   The patient's goals for the shift include      The clinical goals for the shift include Pt will be free from falls/injuries during this shift

## 2025-02-27 LAB
ALBUMIN SERPL BCP-MCNC: 3.2 G/DL (ref 3.4–5)
ANION GAP SERPL CALC-SCNC: 14 MMOL/L (ref 10–20)
BUN SERPL-MCNC: 20 MG/DL (ref 6–23)
CALCIUM SERPL-MCNC: 8.9 MG/DL (ref 8.6–10.6)
CHLORIDE SERPL-SCNC: 110 MMOL/L (ref 98–107)
CO2 SERPL-SCNC: 25 MMOL/L (ref 21–32)
CREAT SERPL-MCNC: 0.92 MG/DL (ref 0.5–1.3)
EGFRCR SERPLBLD CKD-EPI 2021: >90 ML/MIN/1.73M*2
GLUCOSE BLD MANUAL STRIP-MCNC: 107 MG/DL (ref 74–99)
GLUCOSE BLD MANUAL STRIP-MCNC: 137 MG/DL (ref 74–99)
GLUCOSE BLD MANUAL STRIP-MCNC: 139 MG/DL (ref 74–99)
GLUCOSE BLD MANUAL STRIP-MCNC: 143 MG/DL (ref 74–99)
GLUCOSE SERPL-MCNC: 97 MG/DL (ref 74–99)
PHOSPHATE SERPL-MCNC: 1.5 MG/DL (ref 2.5–4.9)
POTASSIUM SERPL-SCNC: 4.5 MMOL/L (ref 3.5–5.3)
SODIUM SERPL-SCNC: 144 MMOL/L (ref 136–145)

## 2025-02-27 PROCEDURE — 80069 RENAL FUNCTION PANEL: CPT | Performed by: STUDENT IN AN ORGANIZED HEALTH CARE EDUCATION/TRAINING PROGRAM

## 2025-02-27 PROCEDURE — 82947 ASSAY GLUCOSE BLOOD QUANT: CPT

## 2025-02-27 PROCEDURE — 2500000002 HC RX 250 W HCPCS SELF ADMINISTERED DRUGS (ALT 637 FOR MEDICARE OP, ALT 636 FOR OP/ED): Mod: SE | Performed by: NURSE PRACTITIONER

## 2025-02-27 PROCEDURE — 97530 THERAPEUTIC ACTIVITIES: CPT | Mod: GP | Performed by: PHYSICAL THERAPIST

## 2025-02-27 PROCEDURE — 97110 THERAPEUTIC EXERCISES: CPT | Mod: GP | Performed by: PHYSICAL THERAPIST

## 2025-02-27 PROCEDURE — 1100000001 HC PRIVATE ROOM DAILY

## 2025-02-27 PROCEDURE — 36415 COLL VENOUS BLD VENIPUNCTURE: CPT | Performed by: STUDENT IN AN ORGANIZED HEALTH CARE EDUCATION/TRAINING PROGRAM

## 2025-02-27 PROCEDURE — 2500000004 HC RX 250 GENERAL PHARMACY W/ HCPCS (ALT 636 FOR OP/ED): Mod: SE | Performed by: NURSE PRACTITIONER

## 2025-02-27 PROCEDURE — 99232 SBSQ HOSP IP/OBS MODERATE 35: CPT | Performed by: STUDENT IN AN ORGANIZED HEALTH CARE EDUCATION/TRAINING PROGRAM

## 2025-02-27 RX ADMIN — HEPARIN SODIUM 5000 UNITS: 5000 INJECTION, SOLUTION INTRAVENOUS; SUBCUTANEOUS at 13:41

## 2025-02-27 RX ADMIN — CEFTRIAXONE SODIUM 1 G: 1 INJECTION, SOLUTION INTRAVENOUS at 08:19

## 2025-02-27 RX ADMIN — TAMSULOSIN HYDROCHLORIDE 0.4 MG: 0.4 CAPSULE ORAL at 08:19

## 2025-02-27 RX ADMIN — HEPARIN SODIUM 5000 UNITS: 5000 INJECTION, SOLUTION INTRAVENOUS; SUBCUTANEOUS at 21:27

## 2025-02-27 RX ADMIN — HEPARIN SODIUM 5000 UNITS: 5000 INJECTION, SOLUTION INTRAVENOUS; SUBCUTANEOUS at 06:16

## 2025-02-27 ASSESSMENT — COGNITIVE AND FUNCTIONAL STATUS - GENERAL
CLIMB 3 TO 5 STEPS WITH RAILING: TOTAL
HELP NEEDED FOR BATHING: A LITTLE
TOILETING: A LITTLE
STANDING UP FROM CHAIR USING ARMS: A LITTLE
MOBILITY SCORE: 21
DAILY ACTIVITIY SCORE: 22
CLIMB 3 TO 5 STEPS WITH RAILING: A LITTLE
TURNING FROM BACK TO SIDE WHILE IN FLAT BAD: A LITTLE
MOBILITY SCORE: 15
MOVING FROM LYING ON BACK TO SITTING ON SIDE OF FLAT BED WITH BEDRAILS: A LITTLE
STANDING UP FROM CHAIR USING ARMS: A LITTLE
WALKING IN HOSPITAL ROOM: A LOT
MOVING TO AND FROM BED TO CHAIR: A LITTLE
WALKING IN HOSPITAL ROOM: A LITTLE

## 2025-02-27 ASSESSMENT — PAIN - FUNCTIONAL ASSESSMENT
PAIN_FUNCTIONAL_ASSESSMENT: 0-10
PAIN_FUNCTIONAL_ASSESSMENT: 0-10

## 2025-02-27 ASSESSMENT — PAIN SCALES - GENERAL
PAINLEVEL_OUTOF10: 0 - NO PAIN
PAINLEVEL_OUTOF10: 0 - NO PAIN

## 2025-02-27 NOTE — CARE PLAN
The patient's goals for the shift include      The clinical goals for the shift include patient will be free from any new infections    Over the shift, the patient did not make progress toward the following goals. Barriers to progression include . Recommendations to address these barriers include   Problem: Skin  Goal: Decreased wound size/increased tissue granulation at next dressing change  Outcome: Progressing  Goal: Participates in plan/prevention/treatment measures  Outcome: Progressing  Goal: Prevent/manage excess moisture  Outcome: Progressing  Goal: Prevent/minimize sheer/friction injuries  Outcome: Progressing  Goal: Promote/optimize nutrition  Outcome: Progressing  Goal: Promote skin healing  Outcome: Progressing     Problem: Fall/Injury  Goal: Not fall by end of shift  Outcome: Progressing  Goal: Be free from injury by end of the shift  Outcome: Progressing  Goal: Verbalize understanding of personal risk factors for fall in the hospital  Outcome: Progressing  Goal: Verbalize understanding of risk factor reduction measures to prevent injury from fall in the home  Outcome: Progressing  Goal: Use assistive devices by end of the shift  Outcome: Progressing  Goal: Pace activities to prevent fatigue by end of the shift  Outcome: Progressing     Problem: Pain  Goal: Takes deep breaths with improved pain control throughout the shift  Outcome: Progressing  Goal: Turns in bed with improved pain control throughout the shift  Outcome: Progressing  Goal: Walks with improved pain control throughout the shift  Outcome: Progressing  Goal: Performs ADL's with improved pain control throughout shift  Outcome: Progressing  Goal: Participates in PT with improved pain control throughout the shift  Outcome: Progressing  Goal: Free from opioid side effects throughout the shift  Outcome: Progressing  Goal: Free from acute confusion related to pain meds throughout the shift  Outcome: Progressing   .

## 2025-02-27 NOTE — PROGRESS NOTES
INTERNAL MEDICINE PROGRESS NOTE     BRIEF NARRATIVE      Gaetano Jarrett is a 64 y.o. male on day 1 of admission presenting with MANUEL (acute kidney injury) (CMS-[facility]).    Pt with PMHx: BPH, cocaine/marijuana abuse, urinary obstruction w/moreno who presented to the ED with c/o urine leaking around his catheter.  Per nursing staff leg band was very black and dirty and urine in bag was old and bag was dirty.  Patient states last moreno bag change was a little more than 1 month ago and EMR collaborates this with an ER visit to [facility] 1/6/25 in which patient had decreased urine output with abdominal pain and the moreno was changed out then.  Discussed with patient that he needs to consistently have moreno changed out and needs urology follow up outpatient. Per several EMR notes patient appears to be homeless and non compliant with follow ups.  Per EMR patient should be on Tamsulosin but patient states he never picked up his script because the pharmacy was too far for him to walk to.  His EMR also notes that there was an attempt earlier this month to help patient get placed in a senior living facility but they were unable to contact patient.      While in the ED patients vitals were stable, labs showed a BUN of 133 and Creat 7.15.  Patient to be admitted observation for MANUEL.    ASSESSMENT / PLANS      Urinary Retention 2/2 BPH  MANUEL- likely obstructive uropathy - significantly improving  UTI  Mild hydronephrosis   -moreno changed out in the ED  -Bun/Creat on arrival 133/7.15 respectively. Repeat after moreno replacement Cr 5.82.   -Cr 1.59 today, confirmed the obstructive nature   -fluids 250cc/hr LR IV for 1 day  -strict I/O  -Renal ultrasound 2/25 showed  dilation of right renal pelvis suggestive of mild hydronephrosis. Will consider CT if creatinine not improving   -continue Ceftriaxone 1gm IV started in the ED  -continue home flomax 0.4mg daily-- Will need homegoing script  -needs outpatient urology referral appt prior to discharge  -needs set up with outpatient moreno exchange  -would benefit from Healthy at home but patient doesn't have stable housing     Generalized weakness   - Pt/Ot  recommending moderate activity after discharge   - will need placement    Mod to severe protein caloric malnutrition  -consult dietitian  -ensure plus with meals     Housing insecurity  Food insecurity  -will consult SW     Fluids: monitor and replete as needed  Electrolytes: monitor and replete as needed  Nutrition: Regular diet   GI prophylaxis: as needed  DVT prophylaxis: SCD  Code Status:   PCP  Pharmacy       SUBJECTIVE       Pt seen and examined today, resting in bed in NAD.     OBJECTIVE      Visit Vitals  /70 (BP Location: Right arm, Patient Position: Lying)   Pulse 95   Temp 36.9 °C (98.4 °F) Comment: Dr Carbajal notified-monitor for now.   Resp 17        Intake/Output Summary (Last 24 hours) at 2/27/2025 1607  Last data filed at 2/27/2025 1325  Gross per 24 hour   Intake --   Output 3275 ml   Net -3275 ml       Physical Exam   Constitutional:       Appearance: Normal appearance.   HENT:      Head: Normocephalic.      Mouth/Throat:      Mouth: Mucous membranes are dry.   Eyes:      Extraocular Movements: Extraocular movements intact.      Conjunctiva/sclera: Conjunctivae normal.      Pupils: Pupils are equal, round, and reactive to light.   Cardiovascular:      Rate and Rhythm: Normal rate and regular rhythm.      Pulses: Normal pulses.      Heart sounds: Normal heart sounds, S1 normal and S2 normal.   Pulmonary:      Effort: Pulmonary effort is normal.      Breath sounds: Normal breath sounds and air entry.   Abdominal:      General: Abdomen is flat. Bowel sounds  are normal.      Palpations: Abdomen is soft.   Genitourinary:     Comments: moreno  Musculoskeletal:         General: Normal range of motion.      Cervical back: Full passive range of motion without pain and normal range of motion.   Skin:     General: Skin is warm and dry.   Neurological:      General: No focal deficit present.      Mental Status: He is alert and oriented to person, place, and time. Mental status is at baseline.   Psychiatric:         Attention and Perception: Attention normal.         Mood and Affect: Mood normal.         Speech: Speech normal.     Current Meds   cefTRIAXone, 1 g, intravenous, Daily  heparin (porcine), 5,000 Units, subcutaneous, q8h  nicotine, 1 patch, transdermal, q24h  sennosides-docusate sodium, 2 tablet, oral, BID  tamsulosin, 0.4 mg, oral, Daily            LABS and IMAGING     WBC   Date Value Ref Range Status   02/26/2025 6.3 4.4 - 11.3 x10*3/uL Final   02/25/2025 9.5 4.4 - 11.3 x10*3/uL Final     Hemoglobin   Date Value Ref Range Status   02/26/2025 11.9 (L) 13.5 - 17.5 g/dL Final   02/25/2025 14.2 13.5 - 17.5 g/dL Final     Hematocrit   Date Value Ref Range Status   02/26/2025 34.8 (L) 41.0 - 52.0 % Final   02/25/2025 40.2 (L) 41.0 - 52.0 % Final     Bicarbonate   Date Value Ref Range Status   02/27/2025 25 21 - 32 mmol/L Final   02/26/2025 22 21 - 32 mmol/L Final   02/25/2025 18 (L) 21 - 32 mmol/L Final     Creatinine   Date Value Ref Range Status   02/27/2025 0.92 0.50 - 1.30 mg/dL Final   02/26/2025 1.59 (H) 0.50 - 1.30 mg/dL Final   02/25/2025 5.82 (H) 0.50 - 1.30 mg/dL Final     Calcium   Date Value Ref Range Status   02/27/2025 8.9 8.6 - 10.6 mg/dL Final   02/26/2025 8.6 8.6 - 10.6 mg/dL Final   02/25/2025 9.1 8.6 - 10.6 mg/dL Final       US renal complete  Narrative: Interpreted By:  Sam Mojica  and Nu Almazan   STUDY:  US RENAL COMPLETE;  2/25/2025 4:18 pm      INDICATION:  Signs/Symptoms:elevated Bun/Creat   r/o hydronephrosis or  other  obstruction.      COMPARISON:  None.      ACCESSION NUMBER(S):  [id number]      ORDERING CLINICIAN:  VANESSA PEPE      TECHNIQUE:  Multiple images of the kidneys were obtained  .      FINDINGS:  RIGHT KIDNEY:  The right kidney measures 10.8 cm in length. The renal cortical  echogenicity and thickness are within normal limits. There is  dilation of the right renal pelvis. Ureters are not well-visualized.  No evidence of nephrolithiasis.      LEFT KIDNEY:  The left kidney measures 10.4 cm in length. The renal cortical  echogenicity and thickness are within normal limits. No  hydronephrosis is present; no evidence of nephrolithiasis.      BLADDER:  Dan catheter in place with a partially decompressed bladder. There  are debris within the bladder.      Impression: Dilation of the right renal pelvis suggestive of mild hydronephrosis.  The ureters are not well-visualized. If there is concern for an  obstructive process, consider further evaluation with CT.      Normal appearance of the left renal parenchyma.      There is debris within the bladder. Recommend correlation with  urinalysis and Dan output to rule out cystitis and hemorrhagic  components.      MACRO:  None      Signed by: Sma Mojica 2/25/2025 7:04 PM  Dictation workstation:   [id number]         PROBLEM LISTS      Problem List Items Addressed This Visit          Coag and Thromboembolic    DVT, lower extremity, proximal, acute, bilateral (Multi)    Relevant Orders    Lower extremity venous duplex left       Genitourinary and Reproductive    * (Principal) MANUEL (acute kidney injury) (CMS-HCC)     Other Visit Diagnoses       Obstruction of Dan catheter, initial encounter (CMS-HCC)                This dictation was created with voice recognition software. Although every effort is made to review the dictation as it is transcribed, on occasion spoken words, phrases, names, numbers, punctuation etc can be misinterpreted by the the technology leading  to omissions or inappropriate outcomes.     Frida Julien MD

## 2025-02-27 NOTE — PROGRESS NOTES
Gaetano Kolby is a 64 y.o. male on day 1 of admission presenting with MANUEL (acute kidney injury) (CMS-HCC).      Dispo: Patient pending precert with [facility].

## 2025-02-27 NOTE — PROGRESS NOTES
Physical Therapy    Physical Therapy Treatment    Patient Name: Gaetano Jarrett  MRN: [id number]  Today's Date: 2/27/2025  Time Calculation  Start Time: 1109  Stop Time: 1133  Time Calculation (min): 24 min     5517/5517-B    Assessment/Plan   PT Assessment  PT Assessment Results: Decreased strength, Impaired balance, Decreased mobility, Decreased safety awareness, Pain  Rehab Prognosis: Good  Barriers to Discharge Home: Caregiver assistance, Physical needs  Caregiver Assistance: Caregiver assistance needed per identified barriers - however, level of patient's required assistance exceeds assistance available at home  Physical Needs: Ambulating household distances limited by function/safety, High falls risk due to function or environment  Social Drivers of Health Considerations: Housing insecurity  Evaluation/Treatment Tolerance: Patient limited by pain  Medical Staff Made Aware: Yes  End of Session Communication: Bedside nurse  Assessment Comment: Pt. continues to be limited by LLE pain/swelling. Communicated to RN at end of session. Remains appropraite for moderate intensity therapy to ensure safety and maximize independence.  End of Session Patient Position: Up in chair, Alarm on  PT Plan  Inpatient/Swing Bed or Outpatient: Inpatient  PT Plan  Treatment/Interventions: Bed mobility, Transfer training, Gait training, Stair training, Therapeutic exercise, Therapeutic activity  PT Plan: Ongoing PT  PT Frequency: 3 times per week  PT Discharge Recommendations: Moderate intensity level of continued care  Equipment Recommended upon Discharge: Wheeled walker  PT Recommended Transfer Status: Assist x1  PT - OK to Discharge: Yes      General Visit Information:   PT  Visit  PT Received On: 02/27/25  Response to Previous Treatment: Patient with no complaints from previous session.  General  Reason for Referral: This 63 y/o M presented to ED 2/25 d/t chronic moreno not draining properly  Past Medical History Relevant to Rehab: BPH,  cocaine/marijuana abuse, urinary obstruction w/ moreno  Family/Caregiver Present: No  Prior to Session Communication: Bedside nurse  Patient Position Received: Bed, 3 rail up, Alarm on  General Comment: Pt. supine in bed, agreeable to participate with encouragement. Limited by LLE pain, difficulty weightbearing, +calf swelling. RN made aware to communicate with medical team.  Subjective     Precautions:  Precautions  Hearing/Visual Limitations: appears WFL  Medical Precautions: Fall precautions    Vital Signs:  Vital Signs  Vitals Session: During PT  Heart Rate: (!) 111  Heart Rate Source: Monitor  SpO2: 100 %  Objective     Pain:  Pain Assessment  Pain Assessment: 0-10  0-10 (Numeric) Pain Score:  (no pain at rest, c/o pain/soreness L calf/posterior leg with mobility. Did not rate numerically,)    Cognition:  Cognition  Overall Cognitive Status: Within Functional Limits  Arousal/Alertness: Appropriate responses to stimuli  Orientation Level: Oriented X4  Following Commands: Follows all commands and directions without difficulty  Insight: Within function limits  Impulsive: Within functional limits               Activity Tolerance:  Activity Tolerance  Endurance: Tolerates 10 - 20 min exercise with multiple rests    Treatments:  Therapeutic Exercise  Therapeutic Exercise Performed: Yes  Therapeutic Exercise Activity 1: seated in chair: Aps x 10, LAQ x 10, marching x10 with cues for eccentric control  Therapeutic Activity  Therapeutic Activity Performed: Yes  Therapeutic Activity 1: bed mobility, gait, transfers     Bed Mobility 1  Bed Mobility 1: Supine to sitting  Level of Assistance 1: Contact guard  Bed Mobility Comments 1: HOB elevated, cues for safety/sequencing  Ambulation/Gait Training  Ambulation/Gait Training Performed: Yes  Ambulation/Gait Training 1  Surface 1: Level tile  Device 1: Rolling walker  Assistance 1: Minimum assistance  Quality of Gait 1: Soft knee(s), Antalgic, Decreased step length, Diminished  heel strike (decreased weight bearing on LLE secondary to pain- prefers toe touch)  Comments/Distance (ft) 1: 2 ft from bed to chair  Transfers  Transfer: Yes  Transfer 1  Technique 1: Sit to stand, Stand to sit  Transfer Device 1: Walker  Transfer Level of Assistance 1: Minimum assistance  Trials/Comments 1: x 2 trials. cues for safely negotiating RW  Stairs  Stairs: No       Outcome Measures:     [facility] Basic Mobility  Turning from your back to your side while in a flat bed without using bedrails: A little  Moving from lying on your back to sitting on the side of a flat bed without using bedrails: A little  Moving to and from bed to chair (including a wheelchair): A little  Standing up from a chair using your arms (e.g. wheelchair or bedside chair): A little  To walk in hospital room: A lot  Climbing 3-5 steps with railing: Total  Basic Mobility - Total Score: 15                                      Education Documentation  Mobility Training, taught by Temi Milner, PT at 2/27/2025  1:07 PM.  Learner: Patient  Readiness: Acceptance  Method: Explanation  Response: Verbalizes Understanding, Needs Reinforcement    Education Comments  No comments found.           EDUCATION:     Encounter Problems       Encounter Problems (Active)       Balance       Will score > 24 on Tinetti to demonstrate low falls risk       Start:  02/25/25    Expected End:  03/11/25               Mobility       STG - Patient will ambulate with CGA-> close (S), LRAD, 50 ft 4..  (Progressing)       Start:  02/25/25    Expected End:  03/11/25            STG - Patient will ascend and descend a flight of stairs with min aX1 as feasibly safe to simulate possible home environment.  (Progressing)       Start:  02/25/25    Expected End:  03/11/25               PT Transfers       STG - Patient will perform bed mobility with (S).  (Progressing)       Start:  02/25/25    Expected End:  03/11/25            STG - Patient will transfer sit to and from stand  with CGA-> close (S).  (Progressing)       Start:  02/25/25    Expected End:  03/11/25            Will complete TUG in less than 14 seconds to demonstrate lower risk for falls. (Progressing)       Start:  02/25/25    Expected End:  03/11/25            will complete 5x STS in less than 11 seconds to demonstrate lower risk for falls.  (Progressing)       Start:  02/25/25    Expected End:  03/11/25

## 2025-02-28 ENCOUNTER — APPOINTMENT (OUTPATIENT)
Dept: VASCULAR MEDICINE | Facility: HOSPITAL | Age: 65
End: 2025-02-28
Payer: COMMERCIAL

## 2025-02-28 VITALS
DIASTOLIC BLOOD PRESSURE: 85 MMHG | HEART RATE: 101 BPM | OXYGEN SATURATION: 96 % | HEIGHT: 66 IN | SYSTOLIC BLOOD PRESSURE: 105 MMHG | RESPIRATION RATE: 18 BRPM | WEIGHT: 115 LBS | BODY MASS INDEX: 18.48 KG/M2 | TEMPERATURE: 97.9 F

## 2025-02-28 LAB
ALBUMIN SERPL BCP-MCNC: 3 G/DL (ref 3.4–5)
ANION GAP SERPL CALC-SCNC: 9 MMOL/L (ref 10–20)
BUN SERPL-MCNC: 14 MG/DL (ref 6–23)
CALCIUM SERPL-MCNC: 8.5 MG/DL (ref 8.6–10.6)
CHLORIDE SERPL-SCNC: 105 MMOL/L (ref 98–107)
CO2 SERPL-SCNC: 28 MMOL/L (ref 21–32)
CREAT SERPL-MCNC: 0.85 MG/DL (ref 0.5–1.3)
EGFRCR SERPLBLD CKD-EPI 2021: >90 ML/MIN/1.73M*2
GLUCOSE BLD MANUAL STRIP-MCNC: 134 MG/DL (ref 74–99)
GLUCOSE BLD MANUAL STRIP-MCNC: 173 MG/DL (ref 74–99)
GLUCOSE SERPL-MCNC: 98 MG/DL (ref 74–99)
PHOSPHATE SERPL-MCNC: 2 MG/DL (ref 2.5–4.9)
POTASSIUM SERPL-SCNC: 4.3 MMOL/L (ref 3.5–5.3)
SODIUM SERPL-SCNC: 138 MMOL/L (ref 136–145)

## 2025-02-28 PROCEDURE — 2500000004 HC RX 250 GENERAL PHARMACY W/ HCPCS (ALT 636 FOR OP/ED): Mod: SE | Performed by: NURSE PRACTITIONER

## 2025-02-28 PROCEDURE — 82435 ASSAY OF BLOOD CHLORIDE: CPT | Performed by: STUDENT IN AN ORGANIZED HEALTH CARE EDUCATION/TRAINING PROGRAM

## 2025-02-28 PROCEDURE — 82947 ASSAY GLUCOSE BLOOD QUANT: CPT

## 2025-02-28 PROCEDURE — 93970 EXTREMITY STUDY: CPT | Performed by: INTERNAL MEDICINE

## 2025-02-28 PROCEDURE — 2500000002 HC RX 250 W HCPCS SELF ADMINISTERED DRUGS (ALT 637 FOR MEDICARE OP, ALT 636 FOR OP/ED): Mod: SE | Performed by: NURSE PRACTITIONER

## 2025-02-28 PROCEDURE — 51702 INSERT TEMP BLADDER CATH: CPT

## 2025-02-28 PROCEDURE — 93970 EXTREMITY STUDY: CPT

## 2025-02-28 PROCEDURE — 93971 EXTREMITY STUDY: CPT

## 2025-02-28 PROCEDURE — 36415 COLL VENOUS BLD VENIPUNCTURE: CPT | Performed by: STUDENT IN AN ORGANIZED HEALTH CARE EDUCATION/TRAINING PROGRAM

## 2025-02-28 PROCEDURE — 2500000001 HC RX 250 WO HCPCS SELF ADMINISTERED DRUGS (ALT 637 FOR MEDICARE OP): Mod: SE | Performed by: STUDENT IN AN ORGANIZED HEALTH CARE EDUCATION/TRAINING PROGRAM

## 2025-02-28 RX ORDER — OXYCODONE AND ACETAMINOPHEN 5; 325 MG/1; MG/1
1 TABLET ORAL EVERY 6 HOURS PRN
Qty: 10 TABLET | Refills: 0 | Status: SHIPPED | OUTPATIENT
Start: 2025-02-28

## 2025-02-28 RX ADMIN — HEPARIN SODIUM 5000 UNITS: 5000 INJECTION, SOLUTION INTRAVENOUS; SUBCUTANEOUS at 05:25

## 2025-02-28 RX ADMIN — APIXABAN 10 MG: 5 TABLET, FILM COATED ORAL at 10:20

## 2025-02-28 RX ADMIN — CEFTRIAXONE SODIUM 1 G: 1 INJECTION, SOLUTION INTRAVENOUS at 10:20

## 2025-02-28 RX ADMIN — TAMSULOSIN HYDROCHLORIDE 0.4 MG: 0.4 CAPSULE ORAL at 10:20

## 2025-02-28 ASSESSMENT — PAIN SCALES - GENERAL
PAINLEVEL_OUTOF10: 3
PAINLEVEL_OUTOF10: 0 - NO PAIN

## 2025-02-28 ASSESSMENT — PAIN - FUNCTIONAL ASSESSMENT
PAIN_FUNCTIONAL_ASSESSMENT: 0-10
PAIN_FUNCTIONAL_ASSESSMENT: 0-10

## 2025-02-28 NOTE — PROGRESS NOTES
Patient accepted to [facility]. Transport scheduled for 2:45pm with [facility]. 7000 complete. Updates sent through [facility].       AUNDREA Nash

## 2025-02-28 NOTE — DOCUMENTATION CLARIFICATION NOTE
"    PATIENT:               SHIVANI MAYNARD  ACCT #:                  [id number]  MRN:                       [id number]  :                       1960  ADMIT DATE:       2025 10:25 AM  DISCH DATE:  RESPONDING PROVIDER #:        [id number]          PROVIDER RESPONSE TEXT:    UTI related to Indwelling Moreno Catheter    CDI QUERY TEXT:    Clarification    Instruction:    Based on your assessment of the patient and the clinical information, please provide the requested documentation by clicking on the appropriate radio button and enter any additional information if prompted.    Question: Please further clarify the relationship between the UTI and the urinary device    When answering this query, please exercise your independent professional judgment. The fact that a question is being asked, does not imply that any particular answer is desired or expected.    The patient's clinical indicators include:  Clinical Information: 64 YOM  who presented to the ED with c/o urine leaking around his catheter.     H&P: \"Per nursing staff leg band was very black and dirty and urine in bag was old and bag was dirty.  Patient states last moreno bag change was a little more than 1 month ago and EMR collaborates this with an ER visit to [facility] in which patient had decreased urine output with abdominal pain and the moreno was changed out then.\"    Clinical Indicators: Leaking around catheter  : UA Appearance-Turbid, Blood 1.0 (3+), Leukocyte Esterase 500 Lupe/uL, WBCs 21-50   Urine Cx: No growth    Treatment: IV Ceftriaxone 1g    Risk Factors: Chronic indwelling moreno catheter, BPH with urinary retention  Options provided:  -- UTI related to Indwelling Moreno Catheter  -- UTI unrelated to Indwelling Moreno Catheter  -- Other - I will add my own diagnosis  -- Refer to Clinical Documentation Reviewer    Query created by: Jailene Taylor on 2025 6:25 PM      Electronically signed by:  DMITRI MONZON MD 2025 8:49 " PM

## 2025-02-28 NOTE — DOCUMENTATION CLARIFICATION NOTE
"    PATIENT:               SHIVANI MAYNARD  ACCT #:                  [id number]  MRN:                       [id number]  :                       1960  ADMIT DATE:       2025 10:25 AM  DISCH DATE:  RESPONDING PROVIDER #:        [id number]          PROVIDER RESPONSE TEXT:    I agree with dietician diagnosis of Severe Malnutrition on 2026    CDI QUERY TEXT:    Clarification    Instruction:    Based on your assessment of the patient and the clinical information, please provide the requested documentation by clicking on the appropriate radio button and enter any additional information if prompted.    Question: Please further clarify this patient nutritional status as    When answering this query, please exercise your independent professional judgment. The fact that a question is being asked, does not imply that any particular answer is desired or expected.    The patient's clinical indicators include:  Clinical Information: 64 YOM who presented to the ED with c/o urine leaking around his catheter.    Clinical Indicators: BMI 18.57    25 Nutrition Consult note: Nutrition Diagnosis  Malnutrition Diagnosis  Patient has Malnutrition Diagnosis: Yes  Diagnosis Status: New  Malnutrition Diagnosis: Severe malnutrition related to chronic disease or condition (and likely r/t environmental circumstances)  As Evidenced by: suboptimal/poor quality diet based on diet hx, hx of weight loss/chronic low body weight (81% DBW/BMI 18.5) and severe degree of muscle wasting\"    Treatment: Nutrition consult, Ensure Plus TID    Risk Factors: suboptimal/poor quality diet based on diet hx, hx of weight loss/chronic low body weight (81% DBW/BMI 18.5) and severe degree of muscle wasting  Options provided:  -- I agree with dietician diagnosis of Severe Malnutrition on 2026  -- Other - I will add my own diagnosis  -- Refer to Clinical Documentation Reviewer    Query created by: Jailene Taylor on 2025 6:26 " PM      Electronically signed by:  DMITRI MONZON MD 2/27/2025 8:49 PM

## 2025-02-28 NOTE — CARE PLAN
The patient's goals for the shift include      The clinical goals for the shift include patient will remain free from falls and injuries during the shift    Over the shift, the patient did not make progress toward the following goals. Barriers to progression include . Recommendations to address these barriers include   Problem: Skin  Goal: Decreased wound size/increased tissue granulation at next dressing change  Outcome: Progressing  Goal: Participates in plan/prevention/treatment measures  Outcome: Progressing  Goal: Prevent/manage excess moisture  Outcome: Progressing  Goal: Prevent/minimize sheer/friction injuries  Outcome: Progressing  Goal: Promote/optimize nutrition  Outcome: Progressing  Goal: Promote skin healing  Outcome: Progressing     Problem: Fall/Injury  Goal: Not fall by end of shift  Outcome: Progressing  Goal: Be free from injury by end of the shift  Outcome: Progressing  Goal: Verbalize understanding of personal risk factors for fall in the hospital  Outcome: Progressing  Goal: Verbalize understanding of risk factor reduction measures to prevent injury from fall in the home  Outcome: Progressing  Goal: Use assistive devices by end of the shift  Outcome: Progressing  Goal: Pace activities to prevent fatigue by end of the shift  Outcome: Progressing     Problem: Pain  Goal: Takes deep breaths with improved pain control throughout the shift  Outcome: Progressing  Goal: Turns in bed with improved pain control throughout the shift  Outcome: Progressing  Goal: Walks with improved pain control throughout the shift  Outcome: Progressing  Goal: Performs ADL's with improved pain control throughout shift  Outcome: Progressing  Goal: Participates in PT with improved pain control throughout the shift  Outcome: Progressing  Goal: Free from opioid side effects throughout the shift  Outcome: Progressing  Goal: Free from acute confusion related to pain meds throughout the shift  Outcome: Progressing   .